# Patient Record
Sex: FEMALE | Race: BLACK OR AFRICAN AMERICAN | Employment: OTHER | URBAN - METROPOLITAN AREA
[De-identification: names, ages, dates, MRNs, and addresses within clinical notes are randomized per-mention and may not be internally consistent; named-entity substitution may affect disease eponyms.]

---

## 2018-01-01 ENCOUNTER — ANESTHESIA EVENT (OUTPATIENT)
Dept: ENDOSCOPY | Age: 78
End: 2018-01-01
Payer: MEDICARE

## 2018-01-01 ENCOUNTER — HOSPITAL ENCOUNTER (OUTPATIENT)
Dept: MRI IMAGING | Age: 78
Discharge: HOME | End: 2018-10-19
Attending: INTERNAL MEDICINE
Payer: MEDICARE

## 2018-01-01 ENCOUNTER — HOSPITAL ENCOUNTER (OUTPATIENT)
Dept: INFUSION THERAPY | Age: 78
Discharge: HOME | End: 2018-10-01
Payer: MEDICARE

## 2018-01-01 ENCOUNTER — HOSPITAL ENCOUNTER (OUTPATIENT)
Dept: CT IMAGING | Age: 78
Discharge: HOME | End: 2018-09-12
Attending: SURGERY
Payer: MEDICARE

## 2018-01-01 ENCOUNTER — HOSPITAL ENCOUNTER (OUTPATIENT)
Dept: INFUSION THERAPY | Age: 78
Discharge: HOME | End: 2018-09-24
Payer: MEDICARE

## 2018-01-01 ENCOUNTER — OFFICE VISIT (OUTPATIENT)
Dept: SURGERY | Age: 78
End: 2018-01-01

## 2018-01-01 ENCOUNTER — APPOINTMENT (OUTPATIENT)
Dept: GENERAL RADIOLOGY | Age: 78
End: 2018-01-01
Attending: INTERNAL MEDICINE
Payer: MEDICARE

## 2018-01-01 ENCOUNTER — HOSPITAL ENCOUNTER (OUTPATIENT)
Age: 78
Setting detail: OUTPATIENT SURGERY
Discharge: HOME | End: 2018-08-20
Attending: INTERNAL MEDICINE | Admitting: INTERNAL MEDICINE
Payer: MEDICARE

## 2018-01-01 ENCOUNTER — HOSPITAL ENCOUNTER (OUTPATIENT)
Dept: CT IMAGING | Age: 78
Discharge: HOME | End: 2018-08-13
Attending: INTERNAL MEDICINE
Payer: MEDICARE

## 2018-01-01 ENCOUNTER — OFFICE VISIT (OUTPATIENT)
Dept: ONCOLOGY | Age: 78
End: 2018-01-01

## 2018-01-01 ENCOUNTER — HOSPITAL ENCOUNTER (OUTPATIENT)
Dept: INFUSION THERAPY | Age: 78
Discharge: HOME | End: 2018-09-17
Payer: MEDICARE

## 2018-01-01 ENCOUNTER — HOSPITAL ENCOUNTER (OUTPATIENT)
Dept: INFUSION THERAPY | Age: 78
Discharge: HOME | End: 2018-09-05
Payer: MEDICARE

## 2018-01-01 ENCOUNTER — HOSPITAL ENCOUNTER (OUTPATIENT)
Age: 78
Setting detail: OUTPATIENT SURGERY
Discharge: HOME | End: 2018-10-17
Attending: INTERNAL MEDICINE | Admitting: INTERNAL MEDICINE
Payer: MEDICARE

## 2018-01-01 ENCOUNTER — TELEPHONE (OUTPATIENT)
Dept: SURGERY | Age: 78
End: 2018-01-01

## 2018-01-01 ENCOUNTER — HOSPITAL ENCOUNTER (OUTPATIENT)
Dept: INFUSION THERAPY | Age: 78
Discharge: HOME | End: 2018-09-07
Payer: MEDICARE

## 2018-01-01 ENCOUNTER — HOSPITAL ENCOUNTER (OUTPATIENT)
Dept: INFUSION THERAPY | Age: 78
Discharge: HOME | End: 2018-10-15
Payer: MEDICARE

## 2018-01-01 ENCOUNTER — HOSPITAL ENCOUNTER (OUTPATIENT)
Dept: INFUSION THERAPY | Age: 78
Discharge: HOME | End: 2018-10-16
Payer: MEDICARE

## 2018-01-01 ENCOUNTER — HOSPITAL ENCOUNTER (OUTPATIENT)
Dept: INFUSION THERAPY | Age: 78
End: 2018-01-01
Payer: MEDICARE

## 2018-01-01 ENCOUNTER — ANESTHESIA (OUTPATIENT)
Dept: ENDOSCOPY | Age: 78
End: 2018-01-01
Payer: MEDICARE

## 2018-01-01 ENCOUNTER — HOSPITAL ENCOUNTER (OUTPATIENT)
Dept: INTERVENTIONAL RADIOLOGY/VASCULAR | Age: 78
Discharge: HOME | End: 2018-09-12
Attending: INTERNAL MEDICINE
Payer: MEDICARE

## 2018-01-01 ENCOUNTER — HOSPITAL ENCOUNTER (OUTPATIENT)
Dept: LAB | Age: 78
Discharge: HOME | End: 2018-09-04
Payer: MEDICARE

## 2018-01-01 ENCOUNTER — HOSPITAL ENCOUNTER (OUTPATIENT)
Dept: INFUSION THERAPY | Age: 78
Discharge: HOME | End: 2018-09-25
Payer: MEDICARE

## 2018-01-01 ENCOUNTER — TELEPHONE (OUTPATIENT)
Dept: ONCOLOGY | Age: 78
End: 2018-01-01

## 2018-01-01 ENCOUNTER — APPOINTMENT (OUTPATIENT)
Dept: INFUSION THERAPY | Age: 78
End: 2018-01-01
Payer: MEDICARE

## 2018-01-01 VITALS
WEIGHT: 143 LBS | BODY MASS INDEX: 21.67 KG/M2 | HEART RATE: 88 BPM | OXYGEN SATURATION: 97 % | TEMPERATURE: 98.6 F | RESPIRATION RATE: 18 BRPM | HEIGHT: 68 IN | DIASTOLIC BLOOD PRESSURE: 70 MMHG | SYSTOLIC BLOOD PRESSURE: 132 MMHG

## 2018-01-01 VITALS
DIASTOLIC BLOOD PRESSURE: 75 MMHG | SYSTOLIC BLOOD PRESSURE: 142 MMHG | RESPIRATION RATE: 18 BRPM | OXYGEN SATURATION: 97 % | WEIGHT: 136 LBS | BODY MASS INDEX: 20.61 KG/M2 | HEART RATE: 80 BPM | TEMPERATURE: 98.9 F | HEIGHT: 68 IN

## 2018-01-01 VITALS
HEART RATE: 78 BPM | BODY MASS INDEX: 20.85 KG/M2 | WEIGHT: 137.56 LBS | SYSTOLIC BLOOD PRESSURE: 160 MMHG | RESPIRATION RATE: 16 BRPM | HEIGHT: 68 IN | TEMPERATURE: 99.2 F | OXYGEN SATURATION: 96 % | DIASTOLIC BLOOD PRESSURE: 73 MMHG

## 2018-01-01 VITALS
DIASTOLIC BLOOD PRESSURE: 74 MMHG | BODY MASS INDEX: 20.85 KG/M2 | RESPIRATION RATE: 16 BRPM | TEMPERATURE: 98.7 F | SYSTOLIC BLOOD PRESSURE: 147 MMHG | HEART RATE: 77 BPM | WEIGHT: 137.56 LBS | OXYGEN SATURATION: 98 % | HEIGHT: 68 IN

## 2018-01-01 VITALS
BODY MASS INDEX: 20.87 KG/M2 | TEMPERATURE: 98.3 F | SYSTOLIC BLOOD PRESSURE: 133 MMHG | OXYGEN SATURATION: 98 % | WEIGHT: 137.7 LBS | HEIGHT: 68 IN | HEART RATE: 82 BPM | RESPIRATION RATE: 18 BRPM | DIASTOLIC BLOOD PRESSURE: 71 MMHG

## 2018-01-01 VITALS
WEIGHT: 136 LBS | OXYGEN SATURATION: 97 % | RESPIRATION RATE: 18 BRPM | HEIGHT: 68 IN | DIASTOLIC BLOOD PRESSURE: 78 MMHG | SYSTOLIC BLOOD PRESSURE: 128 MMHG | HEART RATE: 79 BPM | TEMPERATURE: 98.7 F | BODY MASS INDEX: 20.61 KG/M2

## 2018-01-01 VITALS
BODY MASS INDEX: 20.88 KG/M2 | RESPIRATION RATE: 17 BRPM | HEART RATE: 113 BPM | SYSTOLIC BLOOD PRESSURE: 168 MMHG | OXYGEN SATURATION: 92 % | HEIGHT: 67 IN | WEIGHT: 133 LBS | DIASTOLIC BLOOD PRESSURE: 92 MMHG | TEMPERATURE: 97.6 F

## 2018-01-01 VITALS
RESPIRATION RATE: 18 BRPM | HEART RATE: 85 BPM | OXYGEN SATURATION: 100 % | DIASTOLIC BLOOD PRESSURE: 63 MMHG | TEMPERATURE: 98.1 F | SYSTOLIC BLOOD PRESSURE: 130 MMHG

## 2018-01-01 VITALS
BODY MASS INDEX: 20.16 KG/M2 | TEMPERATURE: 95.9 F | WEIGHT: 133 LBS | DIASTOLIC BLOOD PRESSURE: 74 MMHG | HEART RATE: 103 BPM | HEIGHT: 68 IN | OXYGEN SATURATION: 96 % | RESPIRATION RATE: 16 BRPM | SYSTOLIC BLOOD PRESSURE: 144 MMHG

## 2018-01-01 VITALS
DIASTOLIC BLOOD PRESSURE: 76 MMHG | HEIGHT: 68 IN | WEIGHT: 136.7 LBS | HEART RATE: 85 BPM | TEMPERATURE: 98.2 F | SYSTOLIC BLOOD PRESSURE: 125 MMHG | RESPIRATION RATE: 18 BRPM | BODY MASS INDEX: 20.72 KG/M2

## 2018-01-01 VITALS — WEIGHT: 133 LBS | BODY MASS INDEX: 20.83 KG/M2

## 2018-01-01 VITALS
SYSTOLIC BLOOD PRESSURE: 116 MMHG | DIASTOLIC BLOOD PRESSURE: 54 MMHG | BODY MASS INDEX: 21.29 KG/M2 | HEART RATE: 92 BPM | TEMPERATURE: 97.8 F | OXYGEN SATURATION: 100 % | WEIGHT: 140 LBS | RESPIRATION RATE: 16 BRPM

## 2018-01-01 VITALS
RESPIRATION RATE: 18 BRPM | DIASTOLIC BLOOD PRESSURE: 43 MMHG | TEMPERATURE: 97.7 F | HEART RATE: 81 BPM | OXYGEN SATURATION: 100 % | SYSTOLIC BLOOD PRESSURE: 120 MMHG

## 2018-01-01 VITALS
RESPIRATION RATE: 18 BRPM | OXYGEN SATURATION: 91 % | TEMPERATURE: 98 F | HEIGHT: 67 IN | HEART RATE: 123 BPM | WEIGHT: 133 LBS | SYSTOLIC BLOOD PRESSURE: 154 MMHG | BODY MASS INDEX: 20.88 KG/M2 | DIASTOLIC BLOOD PRESSURE: 86 MMHG

## 2018-01-01 VITALS
DIASTOLIC BLOOD PRESSURE: 76 MMHG | TEMPERATURE: 98.6 F | SYSTOLIC BLOOD PRESSURE: 143 MMHG | HEART RATE: 68 BPM | HEIGHT: 67 IN | BODY MASS INDEX: 21.19 KG/M2 | RESPIRATION RATE: 18 BRPM | WEIGHT: 135 LBS

## 2018-01-01 VITALS
HEIGHT: 67 IN | BODY MASS INDEX: 21.03 KG/M2 | DIASTOLIC BLOOD PRESSURE: 59 MMHG | TEMPERATURE: 98.7 F | SYSTOLIC BLOOD PRESSURE: 151 MMHG | WEIGHT: 134 LBS | OXYGEN SATURATION: 100 % | RESPIRATION RATE: 18 BRPM | HEART RATE: 92 BPM

## 2018-01-01 VITALS
DIASTOLIC BLOOD PRESSURE: 78 MMHG | RESPIRATION RATE: 16 BRPM | HEART RATE: 99 BPM | TEMPERATURE: 97.9 F | SYSTOLIC BLOOD PRESSURE: 146 MMHG | OXYGEN SATURATION: 98 %

## 2018-01-01 VITALS
TEMPERATURE: 98.6 F | OXYGEN SATURATION: 96 % | SYSTOLIC BLOOD PRESSURE: 154 MMHG | DIASTOLIC BLOOD PRESSURE: 82 MMHG | RESPIRATION RATE: 18 BRPM | HEART RATE: 70 BPM

## 2018-01-01 DIAGNOSIS — T45.1X5A ANEMIA DUE TO CHEMOTHERAPY: ICD-10-CM

## 2018-01-01 DIAGNOSIS — C23 GALLBLADDER CANCER (HCC): ICD-10-CM

## 2018-01-01 DIAGNOSIS — D64.81 ANEMIA DUE TO CHEMOTHERAPY: ICD-10-CM

## 2018-01-01 DIAGNOSIS — R17 JAUNDICE: ICD-10-CM

## 2018-01-01 DIAGNOSIS — E87.6 HYPOKALEMIA: ICD-10-CM

## 2018-01-01 DIAGNOSIS — K82.8 GALLBLADDER MASS: ICD-10-CM

## 2018-01-01 DIAGNOSIS — R63.0 LOSS OF APPETITE: ICD-10-CM

## 2018-01-01 DIAGNOSIS — L29.9 PRURITUS OF SKIN: ICD-10-CM

## 2018-01-01 DIAGNOSIS — D64.81 ANEMIA ASSOCIATED WITH CHEMOTHERAPY: ICD-10-CM

## 2018-01-01 DIAGNOSIS — E44.0 MODERATE PROTEIN-CALORIE MALNUTRITION (HCC): ICD-10-CM

## 2018-01-01 DIAGNOSIS — R16.0 LIVER MASS: Primary | ICD-10-CM

## 2018-01-01 DIAGNOSIS — T45.1X5A ANEMIA ASSOCIATED WITH CHEMOTHERAPY: ICD-10-CM

## 2018-01-01 DIAGNOSIS — R16.0 LIVER MASS: ICD-10-CM

## 2018-01-01 DIAGNOSIS — C23 PRIMARY CANCER OF GALLBLADDER WITH METASTASIS TO OTHER SITE (HCC): Primary | ICD-10-CM

## 2018-01-01 DIAGNOSIS — D64.9 ANEMIA, UNSPECIFIED TYPE: ICD-10-CM

## 2018-01-01 DIAGNOSIS — E80.6 HYPERBILIRUBINEMIA: ICD-10-CM

## 2018-01-01 DIAGNOSIS — C22.1 CHOLANGIOCARCINOMA (HCC): ICD-10-CM

## 2018-01-01 DIAGNOSIS — C23 GALLBLADDER CANCER (HCC): Primary | ICD-10-CM

## 2018-01-01 LAB
ABO + RH BLD: NORMAL
ALBUMIN SERPL-MCNC: 1.3 G/DL (ref 3.5–5)
ALBUMIN SERPL-MCNC: 1.6 G/DL (ref 3.5–5)
ALBUMIN SERPL-MCNC: 1.6 G/DL (ref 3.5–5)
ALBUMIN SERPL-MCNC: 1.8 G/DL (ref 3.5–5)
ALBUMIN SERPL-MCNC: 2.7 G/DL (ref 3.5–4.8)
ALBUMIN SERPL-MCNC: 3.5 G/DL (ref 3.5–4.8)
ALBUMIN/GLOB SERPL: 0.3 {RATIO} (ref 1.1–2.2)
ALBUMIN/GLOB SERPL: 0.3 {RATIO} (ref 1.1–2.2)
ALBUMIN/GLOB SERPL: 0.4 {RATIO} (ref 1.1–2.2)
ALBUMIN/GLOB SERPL: 0.4 {RATIO} (ref 1.1–2.2)
ALBUMIN/GLOB SERPL: 0.9 {RATIO} (ref 1.2–2.2)
ALBUMIN/GLOB SERPL: 1.1 {RATIO} (ref 1.2–2.2)
ALP SERPL-CCNC: 520 U/L (ref 45–117)
ALP SERPL-CCNC: 658 IU/L (ref 39–117)
ALP SERPL-CCNC: 682 U/L (ref 45–117)
ALP SERPL-CCNC: 692 U/L (ref 45–117)
ALP SERPL-CCNC: 706 U/L (ref 45–117)
ALP SERPL-CCNC: 812 IU/L (ref 39–117)
ALT SERPL-CCNC: 268 IU/L (ref 0–32)
ALT SERPL-CCNC: 42 U/L (ref 12–78)
ALT SERPL-CCNC: 43 U/L (ref 12–78)
ALT SERPL-CCNC: 62 U/L (ref 12–78)
ALT SERPL-CCNC: 67 U/L (ref 12–78)
ALT SERPL-CCNC: 87 IU/L (ref 0–32)
ANION GAP SERPL CALC-SCNC: 6 MMOL/L (ref 5–15)
ANION GAP SERPL CALC-SCNC: 8 MMOL/L (ref 5–15)
ANION GAP SERPL CALC-SCNC: 9 MMOL/L (ref 5–15)
ANION GAP SERPL CALC-SCNC: 9 MMOL/L (ref 5–15)
AST SERPL-CCNC: 120 IU/L (ref 0–40)
AST SERPL-CCNC: 226 IU/L (ref 0–40)
AST SERPL-CCNC: 54 U/L (ref 15–37)
AST SERPL-CCNC: 65 U/L (ref 15–37)
AST SERPL-CCNC: 69 U/L (ref 15–37)
AST SERPL-CCNC: 94 U/L (ref 15–37)
BASOPHILS # BLD AUTO: 0 X10E3/UL (ref 0–0.2)
BASOPHILS # BLD: 0 K/UL (ref 0–0.1)
BASOPHILS NFR BLD AUTO: 0 %
BASOPHILS NFR BLD: 0 % (ref 0–1)
BILIRUB SERPL-MCNC: 14.1 MG/DL (ref 0–1.2)
BILIRUB SERPL-MCNC: 4 MG/DL (ref 0.2–1)
BILIRUB SERPL-MCNC: 4.2 MG/DL (ref 0.2–1)
BILIRUB SERPL-MCNC: 4.4 MG/DL (ref 0.2–1)
BILIRUB SERPL-MCNC: 6.1 MG/DL (ref 0–1.2)
BILIRUB SERPL-MCNC: 7.7 MG/DL (ref 0.2–1)
BLD PROD TYP BPU: NORMAL
BLOOD GROUP ANTIBODIES SERPL: NORMAL
BPU ID: NORMAL
BUN SERPL-MCNC: 11 MG/DL (ref 6–20)
BUN SERPL-MCNC: 13 MG/DL (ref 6–20)
BUN SERPL-MCNC: 15 MG/DL (ref 8–27)
BUN SERPL-MCNC: 16 MG/DL (ref 6–20)
BUN SERPL-MCNC: 23 MG/DL (ref 8–27)
BUN SERPL-MCNC: 33 MG/DL (ref 6–20)
BUN/CREAT SERPL: 14 (ref 12–20)
BUN/CREAT SERPL: 14 (ref 12–20)
BUN/CREAT SERPL: 19 (ref 12–20)
BUN/CREAT SERPL: 20 (ref 12–28)
BUN/CREAT SERPL: 21 (ref 12–28)
BUN/CREAT SERPL: 30 (ref 12–20)
CALCIUM SERPL-MCNC: 7.1 MG/DL (ref 8.5–10.1)
CALCIUM SERPL-MCNC: 7.8 MG/DL (ref 8.5–10.1)
CALCIUM SERPL-MCNC: 7.9 MG/DL (ref 8.5–10.1)
CALCIUM SERPL-MCNC: 8.1 MG/DL (ref 8.5–10.1)
CALCIUM SERPL-MCNC: 8.3 MG/DL (ref 8.7–10.3)
CALCIUM SERPL-MCNC: 9.1 MG/DL (ref 8.7–10.3)
CANCER AG19-9 SERPL-ACNC: ABNORMAL U/ML (ref 0–35)
CHLORIDE SERPL-SCNC: 101 MMOL/L (ref 96–106)
CHLORIDE SERPL-SCNC: 101 MMOL/L (ref 96–106)
CHLORIDE SERPL-SCNC: 103 MMOL/L (ref 97–108)
CHLORIDE SERPL-SCNC: 96 MMOL/L (ref 97–108)
CHLORIDE SERPL-SCNC: 96 MMOL/L (ref 97–108)
CHLORIDE SERPL-SCNC: 97 MMOL/L (ref 97–108)
CO2 SERPL-SCNC: 20 MMOL/L (ref 20–29)
CO2 SERPL-SCNC: 20 MMOL/L (ref 20–29)
CO2 SERPL-SCNC: 25 MMOL/L (ref 21–32)
CO2 SERPL-SCNC: 27 MMOL/L (ref 21–32)
CO2 SERPL-SCNC: 28 MMOL/L (ref 21–32)
CO2 SERPL-SCNC: 29 MMOL/L (ref 21–32)
CREAT BLD-MCNC: 1.1 MG/DL (ref 0.6–1.3)
CREAT SERPL-MCNC: 0.74 MG/DL (ref 0.57–1)
CREAT SERPL-MCNC: 0.79 MG/DL (ref 0.55–1.02)
CREAT SERPL-MCNC: 0.84 MG/DL (ref 0.55–1.02)
CREAT SERPL-MCNC: 0.9 MG/DL (ref 0.55–1.02)
CREAT SERPL-MCNC: 1.1 MG/DL (ref 0.55–1.02)
CREAT SERPL-MCNC: 1.12 MG/DL (ref 0.57–1)
CROSSMATCH RESULT,%XM: NORMAL
DIFFERENTIAL METHOD BLD: ABNORMAL
EOSINOPHIL # BLD AUTO: 0.1 X10E3/UL (ref 0–0.4)
EOSINOPHIL # BLD: 0 K/UL (ref 0–0.4)
EOSINOPHIL # BLD: 0.1 K/UL (ref 0–0.4)
EOSINOPHIL NFR BLD AUTO: 1 %
EOSINOPHIL NFR BLD: 0 % (ref 0–7)
EOSINOPHIL NFR BLD: 1 % (ref 0–7)
ERYTHROCYTE [DISTWIDTH] IN BLOOD BY AUTOMATED COUNT: 14.5 % (ref 11.5–14.5)
ERYTHROCYTE [DISTWIDTH] IN BLOOD BY AUTOMATED COUNT: 15 % (ref 12.3–15.4)
ERYTHROCYTE [DISTWIDTH] IN BLOOD BY AUTOMATED COUNT: 15.2 % (ref 11.5–14.5)
ERYTHROCYTE [DISTWIDTH] IN BLOOD BY AUTOMATED COUNT: 17 % (ref 12.3–15.4)
ERYTHROCYTE [DISTWIDTH] IN BLOOD BY AUTOMATED COUNT: 18.3 % (ref 11.5–14.5)
ERYTHROCYTE [DISTWIDTH] IN BLOOD BY AUTOMATED COUNT: 19 % (ref 11.5–14.5)
FERRITIN SERPL-MCNC: 827 NG/ML (ref 8–252)
GLOBULIN SER CALC-MCNC: 3.1 G/DL (ref 1.5–4.5)
GLOBULIN SER CALC-MCNC: 3.2 G/DL (ref 1.5–4.5)
GLOBULIN SER CALC-MCNC: 4 G/DL (ref 2–4)
GLOBULIN SER CALC-MCNC: 4.2 G/DL (ref 2–4)
GLOBULIN SER CALC-MCNC: 4.3 G/DL (ref 2–4)
GLOBULIN SER CALC-MCNC: 4.7 G/DL (ref 2–4)
GLUCOSE SERPL-MCNC: 123 MG/DL (ref 65–100)
GLUCOSE SERPL-MCNC: 174 MG/DL (ref 65–100)
GLUCOSE SERPL-MCNC: 180 MG/DL (ref 65–100)
GLUCOSE SERPL-MCNC: 181 MG/DL (ref 65–99)
GLUCOSE SERPL-MCNC: 320 MG/DL (ref 65–100)
GLUCOSE SERPL-MCNC: 72 MG/DL (ref 65–99)
HCT VFR BLD AUTO: 19.7 % (ref 35–47)
HCT VFR BLD AUTO: 20.6 % (ref 34–46.6)
HCT VFR BLD AUTO: 21.5 % (ref 35–47)
HCT VFR BLD AUTO: 21.7 % (ref 35–47)
HCT VFR BLD AUTO: 24.6 % (ref 35–47)
HCT VFR BLD AUTO: 27.9 % (ref 34–46.6)
HGB BLD-MCNC: 6.5 G/DL (ref 11.1–15.9)
HGB BLD-MCNC: 6.6 G/DL (ref 11.5–16)
HGB BLD-MCNC: 7 G/DL (ref 11.5–16)
HGB BLD-MCNC: 7.1 G/DL (ref 11.5–16)
HGB BLD-MCNC: 8.1 G/DL (ref 11.5–16)
HGB BLD-MCNC: 9.1 G/DL (ref 11.1–15.9)
IMM GRANULOCYTES # BLD: 0 K/UL (ref 0–0.04)
IMM GRANULOCYTES # BLD: 0.1 K/UL (ref 0–0.04)
IMM GRANULOCYTES # BLD: 0.1 K/UL (ref 0–0.04)
IMM GRANULOCYTES # BLD: 0.1 X10E3/UL (ref 0–0.1)
IMM GRANULOCYTES # BLD: 0.4 K/UL (ref 0–0.04)
IMM GRANULOCYTES NFR BLD AUTO: 0 % (ref 0–0.5)
IMM GRANULOCYTES NFR BLD AUTO: 1 % (ref 0–0.5)
IMM GRANULOCYTES NFR BLD AUTO: 1 % (ref 0–0.5)
IMM GRANULOCYTES NFR BLD AUTO: 4 % (ref 0–0.5)
IMM GRANULOCYTES NFR BLD: 1 %
IRON SATN MFR SERPL: 13 % (ref 20–50)
IRON SERPL-MCNC: 18 UG/DL (ref 35–150)
LYMPHOCYTES # BLD AUTO: 1.4 X10E3/UL (ref 0.7–3.1)
LYMPHOCYTES # BLD: 1 K/UL (ref 0.8–3.5)
LYMPHOCYTES # BLD: 1.1 K/UL (ref 0.8–3.5)
LYMPHOCYTES NFR BLD AUTO: 14 %
LYMPHOCYTES NFR BLD: 10 % (ref 12–49)
LYMPHOCYTES NFR BLD: 12 % (ref 12–49)
LYMPHOCYTES NFR BLD: 8 % (ref 12–49)
LYMPHOCYTES NFR BLD: 9 % (ref 12–49)
MAGNESIUM SERPL-MCNC: 1.3 MG/DL (ref 1.6–2.4)
MAGNESIUM SERPL-MCNC: 1.4 MG/DL (ref 1.6–2.4)
MAGNESIUM SERPL-MCNC: 1.5 MG/DL (ref 1.6–2.4)
MAGNESIUM SERPL-MCNC: 1.5 MG/DL (ref 1.6–2.4)
MCH RBC QN AUTO: 28.2 PG (ref 26.6–33)
MCH RBC QN AUTO: 29 PG (ref 26.6–33)
MCH RBC QN AUTO: 29.5 PG (ref 26–34)
MCH RBC QN AUTO: 29.5 PG (ref 26–34)
MCH RBC QN AUTO: 30 PG (ref 26–34)
MCH RBC QN AUTO: 30.9 PG (ref 26–34)
MCHC RBC AUTO-ENTMCNC: 31.6 G/DL (ref 31.5–35.7)
MCHC RBC AUTO-ENTMCNC: 32.6 G/DL (ref 30–36.5)
MCHC RBC AUTO-ENTMCNC: 32.6 G/DL (ref 31.5–35.7)
MCHC RBC AUTO-ENTMCNC: 32.7 G/DL (ref 30–36.5)
MCHC RBC AUTO-ENTMCNC: 32.9 G/DL (ref 30–36.5)
MCHC RBC AUTO-ENTMCNC: 33.5 G/DL (ref 30–36.5)
MCV RBC AUTO: 86 FL (ref 79–97)
MCV RBC AUTO: 87.9 FL (ref 80–99)
MCV RBC AUTO: 90.7 FL (ref 80–99)
MCV RBC AUTO: 91.6 FL (ref 80–99)
MCV RBC AUTO: 92 FL (ref 79–97)
MCV RBC AUTO: 93.9 FL (ref 80–99)
MONOCYTES # BLD AUTO: 0.8 X10E3/UL (ref 0.1–0.9)
MONOCYTES # BLD: 0.1 K/UL (ref 0–1)
MONOCYTES # BLD: 0.7 K/UL (ref 0–1)
MONOCYTES # BLD: 1 K/UL (ref 0–1)
MONOCYTES # BLD: 1.2 K/UL (ref 0–1)
MONOCYTES NFR BLD AUTO: 8 %
MONOCYTES NFR BLD: 1 % (ref 5–13)
MONOCYTES NFR BLD: 13 % (ref 5–13)
MONOCYTES NFR BLD: 7 % (ref 5–13)
MONOCYTES NFR BLD: 9 % (ref 5–13)
NEUTROPHILS # BLD AUTO: 7.6 X10E3/UL (ref 1.4–7)
NEUTROPHILS NFR BLD AUTO: 76 %
NEUTS BAND NFR BLD MANUAL: 1 %
NEUTS SEG # BLD: 11.3 K/UL (ref 1.8–8)
NEUTS SEG # BLD: 6.8 K/UL (ref 1.8–8)
NEUTS SEG # BLD: 7.1 K/UL (ref 1.8–8)
NEUTS SEG # BLD: 8.9 K/UL (ref 1.8–8)
NEUTS SEG NFR BLD: 72 % (ref 32–75)
NEUTS SEG NFR BLD: 78 % (ref 32–75)
NEUTS SEG NFR BLD: 80 % (ref 32–75)
NEUTS SEG NFR BLD: 90 % (ref 32–75)
NRBC # BLD: 0 K/UL (ref 0–0.01)
NRBC # BLD: 0 K/UL (ref 0–0.01)
NRBC # BLD: 0.02 K/UL (ref 0–0.01)
NRBC # BLD: 0.02 K/UL (ref 0–0.01)
NRBC BLD-RTO: 0 PER 100 WBC
NRBC BLD-RTO: 0 PER 100 WBC
NRBC BLD-RTO: 0.2 PER 100 WBC
NRBC BLD-RTO: 0.2 PER 100 WBC
PLATELET # BLD AUTO: 121 K/UL (ref 150–400)
PLATELET # BLD AUTO: 208 K/UL (ref 150–400)
PLATELET # BLD AUTO: 246 K/UL (ref 150–400)
PLATELET # BLD AUTO: 337 X10E3/UL (ref 150–379)
PLATELET # BLD AUTO: 410 X10E3/UL (ref 150–379)
PLATELET # BLD AUTO: 69 K/UL (ref 150–400)
PMV BLD AUTO: 10.2 FL (ref 8.9–12.9)
PMV BLD AUTO: 11 FL (ref 8.9–12.9)
PMV BLD AUTO: 9.9 FL (ref 8.9–12.9)
POTASSIUM SERPL-SCNC: 3.1 MMOL/L (ref 3.5–5.1)
POTASSIUM SERPL-SCNC: 3.1 MMOL/L (ref 3.5–5.1)
POTASSIUM SERPL-SCNC: 3.6 MMOL/L (ref 3.5–5.1)
POTASSIUM SERPL-SCNC: 3.8 MMOL/L (ref 3.5–5.1)
POTASSIUM SERPL-SCNC: 4.1 MMOL/L (ref 3.5–5.2)
POTASSIUM SERPL-SCNC: 4.2 MMOL/L (ref 3.5–5.2)
PROT SERPL-MCNC: 5.6 G/DL (ref 6.4–8.2)
PROT SERPL-MCNC: 5.6 G/DL (ref 6.4–8.2)
PROT SERPL-MCNC: 5.8 G/DL (ref 6–8.5)
PROT SERPL-MCNC: 6 G/DL (ref 6.4–8.2)
PROT SERPL-MCNC: 6.3 G/DL (ref 6.4–8.2)
PROT SERPL-MCNC: 6.7 G/DL (ref 6–8.5)
RBC # BLD AUTO: 2.24 M/UL (ref 3.8–5.2)
RBC # BLD AUTO: 2.24 X10E6/UL (ref 3.77–5.28)
RBC # BLD AUTO: 2.37 M/UL (ref 3.8–5.2)
RBC # BLD AUTO: 2.37 M/UL (ref 3.8–5.2)
RBC # BLD AUTO: 2.62 M/UL (ref 3.8–5.2)
RBC # BLD AUTO: 3.23 X10E6/UL (ref 3.77–5.28)
RBC MORPH BLD: ABNORMAL
SODIUM SERPL-SCNC: 131 MMOL/L (ref 136–145)
SODIUM SERPL-SCNC: 132 MMOL/L (ref 136–145)
SODIUM SERPL-SCNC: 133 MMOL/L (ref 136–145)
SODIUM SERPL-SCNC: 137 MMOL/L (ref 134–144)
SODIUM SERPL-SCNC: 137 MMOL/L (ref 134–144)
SODIUM SERPL-SCNC: 137 MMOL/L (ref 136–145)
SPECIMEN EXP DATE BLD: NORMAL
STATUS OF UNIT,%ST: NORMAL
TIBC SERPL-MCNC: 137 UG/DL (ref 250–450)
UNIT DIVISION, %UDIV: 0
WBC # BLD AUTO: 10 X10E3/UL (ref 3.4–10.8)
WBC # BLD AUTO: 11.1 K/UL (ref 3.6–11)
WBC # BLD AUTO: 12.4 K/UL (ref 3.6–11)
WBC # BLD AUTO: 9 K/UL (ref 3.6–11)
WBC # BLD AUTO: 9 X10E3/UL (ref 3.4–10.8)
WBC # BLD AUTO: 9.5 K/UL (ref 3.6–11)
WBC MORPH BLD: ABNORMAL

## 2018-01-01 PROCEDURE — 82728 ASSAY OF FERRITIN: CPT | Performed by: INTERNAL MEDICINE

## 2018-01-01 PROCEDURE — 74011250636 HC RX REV CODE- 250/636: Performed by: INTERNAL MEDICINE

## 2018-01-01 PROCEDURE — 96375 TX/PRO/DX INJ NEW DRUG ADDON: CPT

## 2018-01-01 PROCEDURE — 83735 ASSAY OF MAGNESIUM: CPT | Performed by: NURSE PRACTITIONER

## 2018-01-01 PROCEDURE — 36415 COLL VENOUS BLD VENIPUNCTURE: CPT | Performed by: INTERNAL MEDICINE

## 2018-01-01 PROCEDURE — 96413 CHEMO IV INFUSION 1 HR: CPT

## 2018-01-01 PROCEDURE — 77030013169 SET IV BLD ICUM -A

## 2018-01-01 PROCEDURE — 77030012596 HC SPHNTOM BILI BSC -E: Performed by: INTERNAL MEDICINE

## 2018-01-01 PROCEDURE — 85025 COMPLETE CBC W/AUTO DIFF WBC: CPT | Performed by: NURSE PRACTITIONER

## 2018-01-01 PROCEDURE — 86900 BLOOD TYPING SEROLOGIC ABO: CPT | Performed by: INTERNAL MEDICINE

## 2018-01-01 PROCEDURE — 74011000258 HC RX REV CODE- 258: Performed by: INTERNAL MEDICINE

## 2018-01-01 PROCEDURE — 74011000250 HC RX REV CODE- 250

## 2018-01-01 PROCEDURE — 36415 COLL VENOUS BLD VENIPUNCTURE: CPT | Performed by: NURSE PRACTITIONER

## 2018-01-01 PROCEDURE — 77030003666 HC NDL SPINAL BD -A

## 2018-01-01 PROCEDURE — 96366 THER/PROPH/DIAG IV INF ADDON: CPT

## 2018-01-01 PROCEDURE — 83735 ASSAY OF MAGNESIUM: CPT | Performed by: INTERNAL MEDICINE

## 2018-01-01 PROCEDURE — 96372 THER/PROPH/DIAG INJ SC/IM: CPT

## 2018-01-01 PROCEDURE — P9016 RBC LEUKOCYTES REDUCED: HCPCS | Performed by: INTERNAL MEDICINE

## 2018-01-01 PROCEDURE — 74011250636 HC RX REV CODE- 250/636

## 2018-01-01 PROCEDURE — 74183 MRI ABD W/O CNTR FLWD CNTR: CPT

## 2018-01-01 PROCEDURE — 77030008684 HC TU ET CUF COVD -B: Performed by: ANESTHESIOLOGY

## 2018-01-01 PROCEDURE — 77030012965 HC NDL HUBR BBMI -A

## 2018-01-01 PROCEDURE — C1874 STENT, COATED/COV W/DEL SYS: HCPCS | Performed by: INTERNAL MEDICINE

## 2018-01-01 PROCEDURE — 77030007288 HC DEV LOK BILI BSC -A: Performed by: INTERNAL MEDICINE

## 2018-01-01 PROCEDURE — 77030031139 HC SUT VCRL2 J&J -A

## 2018-01-01 PROCEDURE — 96417 CHEMO IV INFUS EACH ADDL SEQ: CPT

## 2018-01-01 PROCEDURE — 74177 CT ABD & PELVIS W/CONTRAST: CPT

## 2018-01-01 PROCEDURE — 74011250636 HC RX REV CODE- 250/636: Performed by: RADIOLOGY

## 2018-01-01 PROCEDURE — 77030013992 HC SNR POLYP ENDOSC BSC -B: Performed by: INTERNAL MEDICINE

## 2018-01-01 PROCEDURE — 96367 TX/PROPH/DG ADDL SEQ IV INF: CPT

## 2018-01-01 PROCEDURE — 74011636320 HC RX REV CODE- 636/320: Performed by: RADIOLOGY

## 2018-01-01 PROCEDURE — 74011000250 HC RX REV CODE- 250: Performed by: INTERNAL MEDICINE

## 2018-01-01 PROCEDURE — 74011636320 HC RX REV CODE- 636/320: Performed by: INTERNAL MEDICINE

## 2018-01-01 PROCEDURE — 80053 COMPREHEN METABOLIC PANEL: CPT | Performed by: NURSE PRACTITIONER

## 2018-01-01 PROCEDURE — 96368 THER/DIAG CONCURRENT INF: CPT

## 2018-01-01 PROCEDURE — 36415 COLL VENOUS BLD VENIPUNCTURE: CPT

## 2018-01-01 PROCEDURE — 80053 COMPREHEN METABOLIC PANEL: CPT | Performed by: INTERNAL MEDICINE

## 2018-01-01 PROCEDURE — 36430 TRANSFUSION BLD/BLD COMPNT: CPT

## 2018-01-01 PROCEDURE — 80053 COMPREHEN METABOLIC PANEL: CPT

## 2018-01-01 PROCEDURE — 77030026438 HC STYL ET INTUB CARD -A: Performed by: ANESTHESIOLOGY

## 2018-01-01 PROCEDURE — 76040000008: Performed by: INTERNAL MEDICINE

## 2018-01-01 PROCEDURE — 96365 THER/PROPH/DIAG IV INF INIT: CPT

## 2018-01-01 PROCEDURE — 76060000033 HC ANESTHESIA 1 TO 1.5 HR: Performed by: INTERNAL MEDICINE

## 2018-01-01 PROCEDURE — 74011250637 HC RX REV CODE- 250/637: Performed by: NURSE PRACTITIONER

## 2018-01-01 PROCEDURE — 85025 COMPLETE CBC W/AUTO DIFF WBC: CPT | Performed by: INTERNAL MEDICINE

## 2018-01-01 PROCEDURE — 96361 HYDRATE IV INFUSION ADD-ON: CPT

## 2018-01-01 PROCEDURE — C1726 CATH, BAL DIL, NON-VASCULAR: HCPCS | Performed by: INTERNAL MEDICINE

## 2018-01-01 PROCEDURE — 88112 CYTOPATH CELL ENHANCE TECH: CPT | Performed by: INTERNAL MEDICINE

## 2018-01-01 PROCEDURE — 86920 COMPATIBILITY TEST SPIN: CPT | Performed by: INTERNAL MEDICINE

## 2018-01-01 PROCEDURE — 77030039266 HC ADH SKN EXOFIN S2SG -A

## 2018-01-01 PROCEDURE — A9585 GADOBUTROL INJECTION: HCPCS | Performed by: INTERNAL MEDICINE

## 2018-01-01 PROCEDURE — 74011636320 HC RX REV CODE- 636/320

## 2018-01-01 PROCEDURE — 88173 CYTOPATH EVAL FNA REPORT: CPT | Performed by: SURGERY

## 2018-01-01 PROCEDURE — 99152 MOD SED SAME PHYS/QHP 5/>YRS: CPT

## 2018-01-01 PROCEDURE — 83550 IRON BINDING TEST: CPT | Performed by: INTERNAL MEDICINE

## 2018-01-01 PROCEDURE — 74011000250 HC RX REV CODE- 250: Performed by: RADIOLOGY

## 2018-01-01 PROCEDURE — 86923 COMPATIBILITY TEST ELECTRIC: CPT | Performed by: INTERNAL MEDICINE

## 2018-01-01 PROCEDURE — C1892 INTRO/SHEATH,FIXED,PEEL-AWAY: HCPCS

## 2018-01-01 PROCEDURE — 74011250637 HC RX REV CODE- 250/637: Performed by: INTERNAL MEDICINE

## 2018-01-01 PROCEDURE — 74011250636 HC RX REV CODE- 250/636: Performed by: NURSE PRACTITIONER

## 2018-01-01 PROCEDURE — 76040000007: Performed by: INTERNAL MEDICINE

## 2018-01-01 PROCEDURE — 88305 TISSUE EXAM BY PATHOLOGIST: CPT | Performed by: SURGERY

## 2018-01-01 PROCEDURE — 85025 COMPLETE CBC W/AUTO DIFF WBC: CPT

## 2018-01-01 PROCEDURE — C2625 STENT, NON-COR, TEM W/DEL SY: HCPCS | Performed by: INTERNAL MEDICINE

## 2018-01-01 PROCEDURE — 77030014115

## 2018-01-01 PROCEDURE — 96415 CHEMO IV INFUSION ADDL HR: CPT

## 2018-01-01 PROCEDURE — 77001 FLUOROGUIDE FOR VEIN DEVICE: CPT

## 2018-01-01 PROCEDURE — 82565 ASSAY OF CREATININE: CPT

## 2018-01-01 PROCEDURE — 76060000032 HC ANESTHESIA 0.5 TO 1 HR: Performed by: INTERNAL MEDICINE

## 2018-01-01 PROCEDURE — C1788 PORT, INDWELLING, IMP: HCPCS

## 2018-01-01 PROCEDURE — 77030003503 HC NDL BIOP TISS BD -B

## 2018-01-01 DEVICE — BILIARY STENT WITH NAVIFLEXTM RX DELIVERY SYSTEM
Type: IMPLANTABLE DEVICE | Site: BILE DUCT | Status: FUNCTIONAL
Brand: ADVANIX™ BILIARY

## 2018-01-01 DEVICE — STENT SYSTEM RMV
Type: IMPLANTABLE DEVICE | Status: FUNCTIONAL
Brand: WALLFLEX BILIARY

## 2018-01-01 RX ORDER — SODIUM CHLORIDE 0.9 % (FLUSH) 0.9 %
10 SYRINGE (ML) INJECTION AS NEEDED
Status: DISCONTINUED | OUTPATIENT
Start: 2018-01-01 | End: 2018-01-01

## 2018-01-01 RX ORDER — SODIUM CHLORIDE 9 MG/ML
10 INJECTION INTRAMUSCULAR; INTRAVENOUS; SUBCUTANEOUS AS NEEDED
Status: CANCELLED | OUTPATIENT
Start: 2018-01-01

## 2018-01-01 RX ORDER — DIPHENHYDRAMINE HYDROCHLORIDE 50 MG/ML
50 INJECTION, SOLUTION INTRAMUSCULAR; INTRAVENOUS AS NEEDED
Status: CANCELLED
Start: 2018-01-01

## 2018-01-01 RX ORDER — SODIUM CHLORIDE 0.9 % (FLUSH) 0.9 %
5-10 SYRINGE (ML) INJECTION AS NEEDED
Status: ACTIVE | OUTPATIENT
Start: 2018-01-01 | End: 2018-01-01

## 2018-01-01 RX ORDER — SODIUM CHLORIDE 0.9 % (FLUSH) 0.9 %
5-10 SYRINGE (ML) INJECTION AS NEEDED
Status: DISPENSED | OUTPATIENT
Start: 2018-01-01 | End: 2018-01-01

## 2018-01-01 RX ORDER — ATROPINE SULFATE 0.1 MG/ML
0.5 INJECTION INTRAVENOUS
Status: DISCONTINUED | OUTPATIENT
Start: 2018-01-01 | End: 2018-01-01 | Stop reason: HOSPADM

## 2018-01-01 RX ORDER — FLUMAZENIL 0.1 MG/ML
0.2 INJECTION INTRAVENOUS
Status: DISCONTINUED | OUTPATIENT
Start: 2018-01-01 | End: 2018-01-01 | Stop reason: HOSPADM

## 2018-01-01 RX ORDER — ALBUTEROL SULFATE 0.83 MG/ML
2.5 SOLUTION RESPIRATORY (INHALATION) AS NEEDED
Status: CANCELLED
Start: 2018-01-01

## 2018-01-01 RX ORDER — HYDROCORTISONE SODIUM SUCCINATE 100 MG/2ML
100 INJECTION, POWDER, FOR SOLUTION INTRAMUSCULAR; INTRAVENOUS AS NEEDED
Status: CANCELLED | OUTPATIENT
Start: 2018-01-01

## 2018-01-01 RX ORDER — DIPHENHYDRAMINE HCL 25 MG
25 CAPSULE ORAL ONCE
Status: COMPLETED | OUTPATIENT
Start: 2018-01-01 | End: 2018-01-01

## 2018-01-01 RX ORDER — EPINEPHRINE 0.1 MG/ML
1 INJECTION INTRACARDIAC; INTRAVENOUS
Status: DISCONTINUED | OUTPATIENT
Start: 2018-01-01 | End: 2018-01-01 | Stop reason: HOSPADM

## 2018-01-01 RX ORDER — EPINEPHRINE 1 MG/ML
0.3 INJECTION, SOLUTION, CONCENTRATE INTRAVENOUS AS NEEDED
Status: CANCELLED | OUTPATIENT
Start: 2018-01-01

## 2018-01-01 RX ORDER — ONDANSETRON 2 MG/ML
8 INJECTION INTRAMUSCULAR; INTRAVENOUS AS NEEDED
Status: CANCELLED | OUTPATIENT
Start: 2018-01-01

## 2018-01-01 RX ORDER — ACETAMINOPHEN 325 MG/1
650 TABLET ORAL AS NEEDED
Status: CANCELLED
Start: 2018-01-01

## 2018-01-01 RX ORDER — FENTANYL CITRATE 50 UG/ML
100 INJECTION, SOLUTION INTRAMUSCULAR; INTRAVENOUS
Status: DISCONTINUED | OUTPATIENT
Start: 2018-01-01 | End: 2018-01-01 | Stop reason: HOSPADM

## 2018-01-01 RX ORDER — HEPARIN 100 UNIT/ML
300-500 SYRINGE INTRAVENOUS AS NEEDED
Status: CANCELLED
Start: 2018-12-17

## 2018-01-01 RX ORDER — ACETAMINOPHEN 325 MG/1
650 TABLET ORAL ONCE
Status: COMPLETED | OUTPATIENT
Start: 2018-01-01 | End: 2018-01-01

## 2018-01-01 RX ORDER — SODIUM CHLORIDE 9 MG/ML
25 INJECTION, SOLUTION INTRAVENOUS CONTINUOUS
Status: CANCELLED | OUTPATIENT
Start: 2018-11-19

## 2018-01-01 RX ORDER — HEPARIN 100 UNIT/ML
300-500 SYRINGE INTRAVENOUS AS NEEDED
Status: CANCELLED
Start: 2018-01-01

## 2018-01-01 RX ORDER — SODIUM CHLORIDE 9 MG/ML
10 INJECTION INTRAMUSCULAR; INTRAVENOUS; SUBCUTANEOUS AS NEEDED
Status: CANCELLED | OUTPATIENT
Start: 2018-11-26

## 2018-01-01 RX ORDER — SODIUM CHLORIDE 0.9 % (FLUSH) 0.9 %
10 SYRINGE (ML) INJECTION AS NEEDED
Status: CANCELLED
Start: 2018-01-01

## 2018-01-01 RX ORDER — SODIUM CHLORIDE 0.9 % (FLUSH) 0.9 %
5-10 SYRINGE (ML) INJECTION AS NEEDED
Status: DISCONTINUED | OUTPATIENT
Start: 2018-01-01 | End: 2018-01-01 | Stop reason: HOSPADM

## 2018-01-01 RX ORDER — DEXTROMETHORPHAN/PSEUDOEPHED 2.5-7.5/.8
1.2 DROPS ORAL
Status: DISCONTINUED | OUTPATIENT
Start: 2018-01-01 | End: 2018-01-01 | Stop reason: HOSPADM

## 2018-01-01 RX ORDER — HEPARIN 100 UNIT/ML
300-500 SYRINGE INTRAVENOUS AS NEEDED
Status: CANCELLED | OUTPATIENT
Start: 2018-01-01

## 2018-01-01 RX ORDER — SODIUM CHLORIDE 0.9 % (FLUSH) 0.9 %
5-10 SYRINGE (ML) INJECTION EVERY 8 HOURS
Status: DISCONTINUED | OUTPATIENT
Start: 2018-01-01 | End: 2018-01-01 | Stop reason: HOSPADM

## 2018-01-01 RX ORDER — HEPARIN 100 UNIT/ML
500 SYRINGE INTRAVENOUS AS NEEDED
Status: ACTIVE | OUTPATIENT
Start: 2018-01-01 | End: 2018-01-01

## 2018-01-01 RX ORDER — PROCHLORPERAZINE MALEATE 10 MG
10 TABLET ORAL
Qty: 30 TAB | Refills: 1 | Status: SHIPPED | OUTPATIENT
Start: 2018-01-01

## 2018-01-01 RX ORDER — HYDROCORTISONE SODIUM SUCCINATE 100 MG/2ML
100 INJECTION, POWDER, FOR SOLUTION INTRAMUSCULAR; INTRAVENOUS AS NEEDED
Status: CANCELLED | OUTPATIENT
Start: 2018-11-05

## 2018-01-01 RX ORDER — HEPARIN SODIUM 200 [USP'U]/100ML
400 INJECTION, SOLUTION INTRAVENOUS ONCE
Status: COMPLETED | OUTPATIENT
Start: 2018-01-01 | End: 2018-01-01

## 2018-01-01 RX ORDER — EPINEPHRINE 1 MG/ML
0.3 INJECTION, SOLUTION, CONCENTRATE INTRAVENOUS AS NEEDED
Status: CANCELLED | OUTPATIENT
Start: 2018-12-10

## 2018-01-01 RX ORDER — EPINEPHRINE 1 MG/ML
0.3 INJECTION, SOLUTION, CONCENTRATE INTRAVENOUS AS NEEDED
Status: CANCELLED | OUTPATIENT
Start: 2018-11-19

## 2018-01-01 RX ORDER — LIDOCAINE HYDROCHLORIDE 20 MG/ML
INJECTION, SOLUTION EPIDURAL; INFILTRATION; INTRACAUDAL; PERINEURAL AS NEEDED
Status: DISCONTINUED | OUTPATIENT
Start: 2018-01-01 | End: 2018-01-01 | Stop reason: HOSPADM

## 2018-01-01 RX ORDER — SODIUM CHLORIDE 0.9 % (FLUSH) 0.9 %
10 SYRINGE (ML) INJECTION AS NEEDED
Status: ACTIVE | OUTPATIENT
Start: 2018-01-01 | End: 2018-01-01

## 2018-01-01 RX ORDER — PALONOSETRON 0.05 MG/ML
0.25 INJECTION, SOLUTION INTRAVENOUS ONCE
Status: CANCELLED | OUTPATIENT
Start: 2018-01-01 | End: 2018-01-01

## 2018-01-01 RX ORDER — HEPARIN 100 UNIT/ML
300-500 SYRINGE INTRAVENOUS AS NEEDED
Status: CANCELLED
Start: 2018-11-05

## 2018-01-01 RX ORDER — SODIUM CHLORIDE 9 MG/ML
25 INJECTION, SOLUTION INTRAVENOUS CONTINUOUS
Status: CANCELLED | OUTPATIENT
Start: 2018-01-01 | End: 2018-01-01

## 2018-01-01 RX ORDER — MIDAZOLAM HYDROCHLORIDE 1 MG/ML
5 INJECTION, SOLUTION INTRAMUSCULAR; INTRAVENOUS
Status: DISCONTINUED | OUTPATIENT
Start: 2018-01-01 | End: 2018-01-01 | Stop reason: HOSPADM

## 2018-01-01 RX ORDER — DEXAMETHASONE SODIUM PHOSPHATE 4 MG/ML
INJECTION, SOLUTION INTRA-ARTICULAR; INTRALESIONAL; INTRAMUSCULAR; INTRAVENOUS; SOFT TISSUE AS NEEDED
Status: DISCONTINUED | OUTPATIENT
Start: 2018-01-01 | End: 2018-01-01 | Stop reason: HOSPADM

## 2018-01-01 RX ORDER — SODIUM CHLORIDE 9 MG/ML
250 INJECTION, SOLUTION INTRAVENOUS AS NEEDED
Status: DISPENSED | OUTPATIENT
Start: 2018-01-01 | End: 2018-01-01

## 2018-01-01 RX ORDER — PROPOFOL 10 MG/ML
INJECTION, EMULSION INTRAVENOUS AS NEEDED
Status: DISCONTINUED | OUTPATIENT
Start: 2018-01-01 | End: 2018-01-01 | Stop reason: HOSPADM

## 2018-01-01 RX ORDER — LIDOCAINE HYDROCHLORIDE 20 MG/ML
50 INJECTION, SOLUTION INFILTRATION; PERINEURAL ONCE
Status: COMPLETED | OUTPATIENT
Start: 2018-01-01 | End: 2018-01-01

## 2018-01-01 RX ORDER — DIPHENHYDRAMINE HYDROCHLORIDE 50 MG/ML
50 INJECTION, SOLUTION INTRAMUSCULAR; INTRAVENOUS AS NEEDED
Status: CANCELLED
Start: 2018-12-17

## 2018-01-01 RX ORDER — PALONOSETRON 0.05 MG/ML
0.25 INJECTION, SOLUTION INTRAVENOUS ONCE
Status: CANCELLED | OUTPATIENT
Start: 2018-11-05 | End: 2018-01-01

## 2018-01-01 RX ORDER — CEFAZOLIN SODIUM 1 G/3ML
INJECTION, POWDER, FOR SOLUTION INTRAMUSCULAR; INTRAVENOUS AS NEEDED
Status: DISCONTINUED | OUTPATIENT
Start: 2018-01-01 | End: 2018-01-01 | Stop reason: HOSPADM

## 2018-01-01 RX ORDER — SODIUM CHLORIDE 9 MG/ML
25 INJECTION, SOLUTION INTRAVENOUS CONTINUOUS
Status: CANCELLED | OUTPATIENT
Start: 2018-12-10

## 2018-01-01 RX ORDER — ONDANSETRON 2 MG/ML
8 INJECTION INTRAMUSCULAR; INTRAVENOUS AS NEEDED
Status: CANCELLED | OUTPATIENT
Start: 2018-12-10

## 2018-01-01 RX ORDER — DIPHENHYDRAMINE HYDROCHLORIDE 50 MG/ML
50 INJECTION, SOLUTION INTRAMUSCULAR; INTRAVENOUS AS NEEDED
Status: CANCELLED
Start: 2018-11-19

## 2018-01-01 RX ORDER — INDOMETHACIN 50 MG/1
50 CAPSULE ORAL 3 TIMES DAILY
COMMUNITY

## 2018-01-01 RX ORDER — SODIUM CHLORIDE 9 MG/ML
25 INJECTION, SOLUTION INTRAVENOUS CONTINUOUS
Status: DISCONTINUED | OUTPATIENT
Start: 2018-01-01 | End: 2018-01-01 | Stop reason: HOSPADM

## 2018-01-01 RX ORDER — SODIUM CHLORIDE 9 MG/ML
10 INJECTION INTRAMUSCULAR; INTRAVENOUS; SUBCUTANEOUS AS NEEDED
Status: DISCONTINUED | OUTPATIENT
Start: 2018-01-01 | End: 2018-01-01

## 2018-01-01 RX ORDER — ALBUTEROL SULFATE 0.83 MG/ML
2.5 SOLUTION RESPIRATORY (INHALATION) AS NEEDED
Status: CANCELLED
Start: 2018-12-17

## 2018-01-01 RX ORDER — ROCURONIUM BROMIDE 10 MG/ML
INJECTION, SOLUTION INTRAVENOUS AS NEEDED
Status: DISCONTINUED | OUTPATIENT
Start: 2018-01-01 | End: 2018-01-01 | Stop reason: HOSPADM

## 2018-01-01 RX ORDER — ACETAMINOPHEN 325 MG/1
650 TABLET ORAL AS NEEDED
Status: CANCELLED
Start: 2018-12-10

## 2018-01-01 RX ORDER — SODIUM CHLORIDE 9 MG/ML
50 INJECTION, SOLUTION INTRAVENOUS CONTINUOUS
Status: DISCONTINUED | OUTPATIENT
Start: 2018-01-01 | End: 2018-01-01 | Stop reason: HOSPADM

## 2018-01-01 RX ORDER — ONDANSETRON 2 MG/ML
8 INJECTION INTRAMUSCULAR; INTRAVENOUS AS NEEDED
Status: CANCELLED | OUTPATIENT
Start: 2018-11-19

## 2018-01-01 RX ORDER — POTASSIUM CHLORIDE 750 MG/1
40 TABLET, FILM COATED, EXTENDED RELEASE ORAL ONCE
Status: COMPLETED | OUTPATIENT
Start: 2018-01-01 | End: 2018-01-01

## 2018-01-01 RX ORDER — LIDOCAINE HYDROCHLORIDE AND EPINEPHRINE 10; 10 MG/ML; UG/ML
1.5 INJECTION, SOLUTION INFILTRATION; PERINEURAL ONCE
Status: COMPLETED | OUTPATIENT
Start: 2018-01-01 | End: 2018-01-01

## 2018-01-01 RX ORDER — SODIUM CHLORIDE 0.9 % (FLUSH) 0.9 %
10-40 SYRINGE (ML) INJECTION AS NEEDED
Status: ACTIVE | OUTPATIENT
Start: 2018-01-01 | End: 2018-01-01

## 2018-01-01 RX ORDER — ALBUTEROL SULFATE 0.83 MG/ML
2.5 SOLUTION RESPIRATORY (INHALATION) AS NEEDED
Status: CANCELLED
Start: 2018-11-19

## 2018-01-01 RX ORDER — HYDROCORTISONE SODIUM SUCCINATE 100 MG/2ML
100 INJECTION, POWDER, FOR SOLUTION INTRAMUSCULAR; INTRAVENOUS AS NEEDED
Status: CANCELLED | OUTPATIENT
Start: 2018-12-17

## 2018-01-01 RX ORDER — SODIUM CHLORIDE 9 MG/ML
10 INJECTION INTRAMUSCULAR; INTRAVENOUS; SUBCUTANEOUS AS NEEDED
Status: CANCELLED | OUTPATIENT
Start: 2018-11-05

## 2018-01-01 RX ORDER — FENTANYL CITRATE 50 UG/ML
INJECTION, SOLUTION INTRAMUSCULAR; INTRAVENOUS AS NEEDED
Status: DISCONTINUED | OUTPATIENT
Start: 2018-01-01 | End: 2018-01-01 | Stop reason: HOSPADM

## 2018-01-01 RX ORDER — SODIUM CHLORIDE 9 MG/ML
INJECTION, SOLUTION INTRAVENOUS
Status: DISCONTINUED | OUTPATIENT
Start: 2018-01-01 | End: 2018-01-01 | Stop reason: HOSPADM

## 2018-01-01 RX ORDER — SODIUM CHLORIDE 9 MG/ML
25 INJECTION, SOLUTION INTRAVENOUS CONTINUOUS
Status: CANCELLED | OUTPATIENT
Start: 2018-01-01

## 2018-01-01 RX ORDER — SODIUM CHLORIDE 0.9 % (FLUSH) 0.9 %
10 SYRINGE (ML) INJECTION AS NEEDED
Status: CANCELLED
Start: 2018-11-05

## 2018-01-01 RX ORDER — PALONOSETRON 0.05 MG/ML
0.25 INJECTION, SOLUTION INTRAVENOUS ONCE
Status: COMPLETED | OUTPATIENT
Start: 2018-01-01 | End: 2018-01-01

## 2018-01-01 RX ORDER — SODIUM CHLORIDE 9 MG/ML
25 INJECTION, SOLUTION INTRAVENOUS CONTINUOUS
Status: DISPENSED | OUTPATIENT
Start: 2018-01-01 | End: 2018-01-01

## 2018-01-01 RX ORDER — EPINEPHRINE 1 MG/ML
0.3 INJECTION, SOLUTION, CONCENTRATE INTRAVENOUS AS NEEDED
Status: CANCELLED | OUTPATIENT
Start: 2018-11-26

## 2018-01-01 RX ORDER — ONDANSETRON 2 MG/ML
INJECTION INTRAMUSCULAR; INTRAVENOUS AS NEEDED
Status: DISCONTINUED | OUTPATIENT
Start: 2018-01-01 | End: 2018-01-01 | Stop reason: HOSPADM

## 2018-01-01 RX ORDER — SODIUM CHLORIDE 0.9 % (FLUSH) 0.9 %
10 SYRINGE (ML) INJECTION AS NEEDED
Status: CANCELLED
Start: 2018-12-17

## 2018-01-01 RX ORDER — ONDANSETRON 2 MG/ML
8 INJECTION INTRAMUSCULAR; INTRAVENOUS AS NEEDED
Status: CANCELLED | OUTPATIENT
Start: 2018-11-05

## 2018-01-01 RX ORDER — CARVEDILOL 25 MG/1
25 TABLET ORAL 2 TIMES DAILY
COMMUNITY

## 2018-01-01 RX ORDER — DIPHENHYDRAMINE HYDROCHLORIDE 50 MG/ML
50 INJECTION, SOLUTION INTRAMUSCULAR; INTRAVENOUS AS NEEDED
Status: CANCELLED
Start: 2018-11-05

## 2018-01-01 RX ORDER — HEPARIN 100 UNIT/ML
300-500 SYRINGE INTRAVENOUS AS NEEDED
Status: DISCONTINUED | OUTPATIENT
Start: 2018-01-01 | End: 2018-01-01

## 2018-01-01 RX ORDER — NALOXONE HYDROCHLORIDE 0.4 MG/ML
0.4 INJECTION, SOLUTION INTRAMUSCULAR; INTRAVENOUS; SUBCUTANEOUS
Status: DISCONTINUED | OUTPATIENT
Start: 2018-01-01 | End: 2018-01-01 | Stop reason: HOSPADM

## 2018-01-01 RX ORDER — GLIPIZIDE 10 MG/1
10 TABLET ORAL 2 TIMES DAILY
COMMUNITY

## 2018-01-01 RX ORDER — MIDAZOLAM HYDROCHLORIDE 1 MG/ML
.25-1 INJECTION, SOLUTION INTRAMUSCULAR; INTRAVENOUS
Status: DISCONTINUED | OUTPATIENT
Start: 2018-01-01 | End: 2018-01-01 | Stop reason: HOSPADM

## 2018-01-01 RX ORDER — ALBUTEROL SULFATE 0.83 MG/ML
2.5 SOLUTION RESPIRATORY (INHALATION) AS NEEDED
Status: CANCELLED
Start: 2018-12-10

## 2018-01-01 RX ORDER — ALBUTEROL SULFATE 0.83 MG/ML
2.5 SOLUTION RESPIRATORY (INHALATION) AS NEEDED
Status: CANCELLED
Start: 2018-11-05

## 2018-01-01 RX ORDER — SODIUM CHLORIDE 9 MG/ML
10 INJECTION INTRAMUSCULAR; INTRAVENOUS; SUBCUTANEOUS AS NEEDED
Status: ACTIVE | OUTPATIENT
Start: 2018-01-01 | End: 2018-01-01

## 2018-01-01 RX ORDER — ACETAMINOPHEN 325 MG/1
650 TABLET ORAL AS NEEDED
Status: CANCELLED
Start: 2018-11-19

## 2018-01-01 RX ORDER — CEFAZOLIN SODIUM 1 G/3ML
INJECTION, POWDER, FOR SOLUTION INTRAMUSCULAR; INTRAVENOUS
Status: COMPLETED
Start: 2018-01-01 | End: 2018-01-01

## 2018-01-01 RX ORDER — DIPHENHYDRAMINE HYDROCHLORIDE 50 MG/ML
50 INJECTION, SOLUTION INTRAMUSCULAR; INTRAVENOUS AS NEEDED
Status: CANCELLED
Start: 2018-12-10

## 2018-01-01 RX ORDER — EPINEPHRINE 1 MG/ML
0.3 INJECTION, SOLUTION, CONCENTRATE INTRAVENOUS AS NEEDED
Status: CANCELLED | OUTPATIENT
Start: 2018-12-17

## 2018-01-01 RX ORDER — SODIUM CHLORIDE 9 MG/ML
10 INJECTION INTRAMUSCULAR; INTRAVENOUS; SUBCUTANEOUS AS NEEDED
Status: DISPENSED | OUTPATIENT
Start: 2018-01-01 | End: 2018-01-01

## 2018-01-01 RX ORDER — SODIUM CHLORIDE 0.9 % (FLUSH) 0.9 %
10 SYRINGE (ML) INJECTION AS NEEDED
Status: CANCELLED
Start: 2018-11-19

## 2018-01-01 RX ORDER — SUCCINYLCHOLINE CHLORIDE 20 MG/ML
INJECTION INTRAMUSCULAR; INTRAVENOUS AS NEEDED
Status: DISCONTINUED | OUTPATIENT
Start: 2018-01-01 | End: 2018-01-01 | Stop reason: HOSPADM

## 2018-01-01 RX ORDER — ALBUTEROL SULFATE 0.83 MG/ML
2.5 SOLUTION RESPIRATORY (INHALATION) AS NEEDED
Status: CANCELLED
Start: 2018-11-26

## 2018-01-01 RX ORDER — CEFAZOLIN SODIUM/WATER 2 G/20 ML
2 SYRINGE (ML) INTRAVENOUS ONCE
Status: COMPLETED | OUTPATIENT
Start: 2018-01-01 | End: 2018-01-01

## 2018-01-01 RX ORDER — HEPARIN 100 UNIT/ML
500 SYRINGE INTRAVENOUS AS NEEDED
Status: DISPENSED | OUTPATIENT
Start: 2018-01-01 | End: 2018-01-01

## 2018-01-01 RX ORDER — FENTANYL CITRATE 50 UG/ML
INJECTION, SOLUTION INTRAMUSCULAR; INTRAVENOUS
Status: COMPLETED
Start: 2018-01-01 | End: 2018-01-01

## 2018-01-01 RX ORDER — SODIUM CHLORIDE 0.9 % (FLUSH) 0.9 %
10 SYRINGE (ML) INJECTION
Status: COMPLETED | OUTPATIENT
Start: 2018-01-01 | End: 2018-01-01

## 2018-01-01 RX ORDER — HEPARIN 100 UNIT/ML
300-500 SYRINGE INTRAVENOUS AS NEEDED
Status: CANCELLED
Start: 2018-11-19

## 2018-01-01 RX ORDER — SODIUM CHLORIDE 9 MG/ML
10 INJECTION INTRAMUSCULAR; INTRAVENOUS; SUBCUTANEOUS AS NEEDED
Status: CANCELLED | OUTPATIENT
Start: 2018-12-17

## 2018-01-01 RX ORDER — HYDROCORTISONE SODIUM SUCCINATE 100 MG/2ML
100 INJECTION, POWDER, FOR SOLUTION INTRAMUSCULAR; INTRAVENOUS AS NEEDED
Status: CANCELLED | OUTPATIENT
Start: 2018-11-19

## 2018-01-01 RX ORDER — LIDOCAINE AND PRILOCAINE 25; 25 MG/G; MG/G
CREAM TOPICAL AS NEEDED
Qty: 30 G | Refills: 0 | Status: SHIPPED | OUTPATIENT
Start: 2018-01-01

## 2018-01-01 RX ORDER — SODIUM CHLORIDE 9 MG/ML
25 INJECTION, SOLUTION INTRAVENOUS CONTINUOUS
Status: CANCELLED | OUTPATIENT
Start: 2018-12-17

## 2018-01-01 RX ORDER — ONDANSETRON 2 MG/ML
8 INJECTION INTRAMUSCULAR; INTRAVENOUS AS NEEDED
Status: CANCELLED | OUTPATIENT
Start: 2018-11-26

## 2018-01-01 RX ORDER — SODIUM CHLORIDE 9 MG/ML
250 INJECTION, SOLUTION INTRAVENOUS AS NEEDED
Status: DISCONTINUED | OUTPATIENT
Start: 2018-01-01 | End: 2018-01-01 | Stop reason: HOSPADM

## 2018-01-01 RX ORDER — SODIUM CHLORIDE 9 MG/ML
50 INJECTION, SOLUTION INTRAVENOUS
Status: COMPLETED | OUTPATIENT
Start: 2018-01-01 | End: 2018-01-01

## 2018-01-01 RX ORDER — HEPARIN 100 UNIT/ML
300-500 SYRINGE INTRAVENOUS AS NEEDED
Status: CANCELLED
Start: 2018-11-26

## 2018-01-01 RX ORDER — EPINEPHRINE 1 MG/ML
0.3 INJECTION, SOLUTION, CONCENTRATE INTRAVENOUS AS NEEDED
Status: CANCELLED | OUTPATIENT
Start: 2018-11-05

## 2018-01-01 RX ORDER — SODIUM CHLORIDE 9 MG/ML
10 INJECTION INTRAMUSCULAR; INTRAVENOUS; SUBCUTANEOUS AS NEEDED
Status: CANCELLED | OUTPATIENT
Start: 2018-11-19

## 2018-01-01 RX ORDER — PALONOSETRON 0.05 MG/ML
0.25 INJECTION, SOLUTION INTRAVENOUS ONCE
Status: CANCELLED | OUTPATIENT
Start: 2018-12-17 | End: 2018-12-03

## 2018-01-01 RX ORDER — DIPHENHYDRAMINE HYDROCHLORIDE 50 MG/ML
50 INJECTION, SOLUTION INTRAMUSCULAR; INTRAVENOUS AS NEEDED
Status: CANCELLED
Start: 2018-11-26

## 2018-01-01 RX ORDER — PALONOSETRON 0.05 MG/ML
0.25 INJECTION, SOLUTION INTRAVENOUS ONCE
Status: CANCELLED | OUTPATIENT
Start: 2018-11-26 | End: 2018-11-12

## 2018-01-01 RX ORDER — LIDOCAINE HYDROCHLORIDE 10 MG/ML
10 INJECTION, SOLUTION EPIDURAL; INFILTRATION; INTRACAUDAL; PERINEURAL
Status: ACTIVE | OUTPATIENT
Start: 2018-01-01 | End: 2018-01-01

## 2018-01-01 RX ORDER — METFORMIN HYDROCHLORIDE 500 MG/1
500 TABLET ORAL 2 TIMES DAILY
COMMUNITY

## 2018-01-01 RX ORDER — HYDROCORTISONE SODIUM SUCCINATE 100 MG/2ML
100 INJECTION, POWDER, FOR SOLUTION INTRAMUSCULAR; INTRAVENOUS AS NEEDED
Status: CANCELLED | OUTPATIENT
Start: 2018-12-10

## 2018-01-01 RX ORDER — SODIUM CHLORIDE 9 MG/ML
10 INJECTION INTRAMUSCULAR; INTRAVENOUS; SUBCUTANEOUS AS NEEDED
Status: CANCELLED | OUTPATIENT
Start: 2018-12-10

## 2018-01-01 RX ORDER — POTASSIUM CHLORIDE 750 MG/1
10 TABLET, EXTENDED RELEASE ORAL DAILY
Qty: 30 TAB | Refills: 1 | Status: SHIPPED | OUTPATIENT
Start: 2018-01-01

## 2018-01-01 RX ORDER — ACETAMINOPHEN 325 MG/1
650 TABLET ORAL AS NEEDED
Status: CANCELLED
Start: 2018-11-05

## 2018-01-01 RX ORDER — HEPARIN 100 UNIT/ML
300-500 SYRINGE INTRAVENOUS AS NEEDED
Status: ACTIVE | OUTPATIENT
Start: 2018-01-01 | End: 2018-01-01

## 2018-01-01 RX ORDER — SODIUM CHLORIDE 0.9 % (FLUSH) 0.9 %
10 SYRINGE (ML) INJECTION AS NEEDED
Status: CANCELLED | OUTPATIENT
Start: 2018-01-01

## 2018-01-01 RX ORDER — ACETAMINOPHEN 325 MG/1
650 TABLET ORAL AS NEEDED
Status: CANCELLED
Start: 2018-11-26

## 2018-01-01 RX ORDER — HEPARIN 100 UNIT/ML
300-500 SYRINGE INTRAVENOUS AS NEEDED
Status: CANCELLED
Start: 2018-12-10

## 2018-01-01 RX ORDER — PALONOSETRON 0.05 MG/ML
0.25 INJECTION, SOLUTION INTRAVENOUS ONCE
Status: CANCELLED | OUTPATIENT
Start: 2018-12-10 | End: 2018-11-26

## 2018-01-01 RX ORDER — SODIUM CHLORIDE 9 MG/ML
25 INJECTION, SOLUTION INTRAVENOUS CONTINUOUS
Status: CANCELLED | OUTPATIENT
Start: 2018-11-05

## 2018-01-01 RX ORDER — SODIUM CHLORIDE 0.9 % (FLUSH) 0.9 %
10 SYRINGE (ML) INJECTION AS NEEDED
Status: CANCELLED
Start: 2018-11-26

## 2018-01-01 RX ORDER — EPHEDRINE SULFATE 50 MG/ML
INJECTION, SOLUTION INTRAVENOUS
Status: DISCONTINUED
Start: 2018-01-01 | End: 2018-01-01 | Stop reason: HOSPADM

## 2018-01-01 RX ORDER — ONDANSETRON 4 MG/1
4 TABLET, ORALLY DISINTEGRATING ORAL
Qty: 30 TAB | Refills: 2 | Status: SHIPPED | OUTPATIENT
Start: 2018-01-01

## 2018-01-01 RX ORDER — SODIUM CHLORIDE 0.9 % (FLUSH) 0.9 %
10 SYRINGE (ML) INJECTION AS NEEDED
Status: CANCELLED
Start: 2018-12-10

## 2018-01-01 RX ORDER — ONDANSETRON 2 MG/ML
8 INJECTION INTRAMUSCULAR; INTRAVENOUS AS NEEDED
Status: CANCELLED | OUTPATIENT
Start: 2018-12-17

## 2018-01-01 RX ORDER — HYDROCORTISONE SODIUM SUCCINATE 100 MG/2ML
100 INJECTION, POWDER, FOR SOLUTION INTRAMUSCULAR; INTRAVENOUS AS NEEDED
Status: CANCELLED | OUTPATIENT
Start: 2018-11-26

## 2018-01-01 RX ORDER — HEPARIN 100 UNIT/ML
300 SYRINGE INTRAVENOUS ONCE
Status: COMPLETED | OUTPATIENT
Start: 2018-01-01 | End: 2018-01-01

## 2018-01-01 RX ORDER — SODIUM CHLORIDE 9 MG/ML
25 INJECTION, SOLUTION INTRAVENOUS CONTINUOUS
Status: CANCELLED | OUTPATIENT
Start: 2018-11-26

## 2018-01-01 RX ORDER — PALONOSETRON 0.05 MG/ML
0.25 INJECTION, SOLUTION INTRAVENOUS ONCE
Status: CANCELLED | OUTPATIENT
Start: 2018-11-19 | End: 2018-11-05

## 2018-01-01 RX ORDER — PHENYLEPHRINE HCL IN 0.9% NACL 0.4MG/10ML
SYRINGE (ML) INTRAVENOUS AS NEEDED
Status: DISCONTINUED | OUTPATIENT
Start: 2018-01-01 | End: 2018-01-01 | Stop reason: HOSPADM

## 2018-01-01 RX ORDER — ACETAMINOPHEN 325 MG/1
650 TABLET ORAL AS NEEDED
Status: CANCELLED
Start: 2018-12-17

## 2018-01-01 RX ADMIN — CEFAZOLIN SODIUM 2 G: 1 INJECTION, POWDER, FOR SOLUTION INTRAMUSCULAR; INTRAVENOUS at 08:25

## 2018-01-01 RX ADMIN — MIDAZOLAM 1 MG: 1 INJECTION INTRAMUSCULAR; INTRAVENOUS at 11:14

## 2018-01-01 RX ADMIN — MIDAZOLAM 0.5 MG: 1 INJECTION INTRAMUSCULAR; INTRAVENOUS at 11:22

## 2018-01-01 RX ADMIN — Medication 2 G: at 11:06

## 2018-01-01 RX ADMIN — MIDAZOLAM 1 MG: 1 INJECTION INTRAMUSCULAR; INTRAVENOUS at 11:08

## 2018-01-01 RX ADMIN — ONDANSETRON 4 MG: 2 INJECTION INTRAMUSCULAR; INTRAVENOUS at 15:50

## 2018-01-01 RX ADMIN — HEPARIN SODIUM IN SODIUM CHLORIDE 800 UNITS: 200 INJECTION INTRAVENOUS at 11:34

## 2018-01-01 RX ADMIN — SODIUM CHLORIDE 25 ML/HR: 900 INJECTION, SOLUTION INTRAVENOUS at 10:14

## 2018-01-01 RX ADMIN — MAGNESIUM SULFATE HEPTAHYDRATE: 500 INJECTION, SOLUTION INTRAMUSCULAR; INTRAVENOUS at 10:40

## 2018-01-01 RX ADMIN — LIDOCAINE HYDROCHLORIDE 40 MG: 20 INJECTION, SOLUTION INFILTRATION; PERINEURAL at 11:33

## 2018-01-01 RX ADMIN — Medication 80 MCG: at 08:21

## 2018-01-01 RX ADMIN — CISPLATIN 43 MG: 1 INJECTION, SOLUTION INTRAVENOUS at 14:40

## 2018-01-01 RX ADMIN — SODIUM CHLORIDE 150 MG: 900 INJECTION, SOLUTION INTRAVENOUS at 12:55

## 2018-01-01 RX ADMIN — Medication 10 ML: at 10:16

## 2018-01-01 RX ADMIN — MIDAZOLAM 1 MG: 1 INJECTION INTRAMUSCULAR; INTRAVENOUS at 09:54

## 2018-01-01 RX ADMIN — Medication 500 UNITS: at 16:29

## 2018-01-01 RX ADMIN — DEXAMETHASONE SODIUM PHOSPHATE 4 MG: 4 INJECTION, SOLUTION INTRA-ARTICULAR; INTRALESIONAL; INTRAMUSCULAR; INTRAVENOUS; SOFT TISSUE at 08:09

## 2018-01-01 RX ADMIN — SODIUM CHLORIDE 250 ML: 900 INJECTION, SOLUTION INTRAVENOUS at 10:19

## 2018-01-01 RX ADMIN — Medication 500 UNITS: at 14:10

## 2018-01-01 RX ADMIN — FENTANYL CITRATE 25 MCG: 50 INJECTION, SOLUTION INTRAMUSCULAR; INTRAVENOUS at 10:21

## 2018-01-01 RX ADMIN — Medication 80 MCG: at 08:27

## 2018-01-01 RX ADMIN — PROPOFOL 130 MG: 10 INJECTION, EMULSION INTRAVENOUS at 07:52

## 2018-01-01 RX ADMIN — POTASSIUM CHLORIDE: 2 INJECTION, SOLUTION, CONCENTRATE INTRAVENOUS at 11:21

## 2018-01-01 RX ADMIN — DIPHENHYDRAMINE HYDROCHLORIDE 25 MG: 25 CAPSULE ORAL at 10:01

## 2018-01-01 RX ADMIN — FENTANYL CITRATE 25 MCG: 50 INJECTION, SOLUTION INTRAMUSCULAR; INTRAVENOUS at 11:08

## 2018-01-01 RX ADMIN — PROPOFOL 150 MG: 10 INJECTION, EMULSION INTRAVENOUS at 15:20

## 2018-01-01 RX ADMIN — Medication 10 ML: at 15:37

## 2018-01-01 RX ADMIN — SODIUM CHLORIDE, PRESERVATIVE FREE 500 UNITS: 5 INJECTION INTRAVENOUS at 13:48

## 2018-01-01 RX ADMIN — SODIUM CHLORIDE 10 ML: 9 INJECTION INTRAMUSCULAR; INTRAVENOUS; SUBCUTANEOUS at 09:11

## 2018-01-01 RX ADMIN — DEXAMETHASONE SODIUM PHOSPHATE 12 MG: 4 INJECTION, SOLUTION INTRA-ARTICULAR; INTRALESIONAL; INTRAMUSCULAR; INTRAVENOUS; SOFT TISSUE at 12:58

## 2018-01-01 RX ADMIN — CEFAZOLIN SODIUM 2 G: 1 INJECTION, POWDER, FOR SOLUTION INTRAMUSCULAR; INTRAVENOUS at 15:33

## 2018-01-01 RX ADMIN — POTASSIUM CHLORIDE: 2 INJECTION, SOLUTION, CONCENTRATE INTRAVENOUS at 14:24

## 2018-01-01 RX ADMIN — ACETAMINOPHEN 650 MG: 325 TABLET ORAL at 10:43

## 2018-01-01 RX ADMIN — SODIUM CHLORIDE: 9 INJECTION, SOLUTION INTRAVENOUS at 15:12

## 2018-01-01 RX ADMIN — FERRIC CARBOXYMALTOSE INJECTION 750 MG: 50 INJECTION, SOLUTION INTRAVENOUS at 10:50

## 2018-01-01 RX ADMIN — ROCURONIUM BROMIDE 5 MG: 10 INJECTION, SOLUTION INTRAVENOUS at 07:52

## 2018-01-01 RX ADMIN — Medication 80 MCG: at 08:29

## 2018-01-01 RX ADMIN — FENTANYL CITRATE 25 MCG: 50 INJECTION, SOLUTION INTRAMUSCULAR; INTRAVENOUS at 11:18

## 2018-01-01 RX ADMIN — SODIUM CHLORIDE 25 ML/HR: 900 INJECTION, SOLUTION INTRAVENOUS at 10:48

## 2018-01-01 RX ADMIN — ONDANSETRON 4 MG: 2 INJECTION INTRAMUSCULAR; INTRAVENOUS at 08:09

## 2018-01-01 RX ADMIN — DEXAMETHASONE SODIUM PHOSPHATE 12 MG: 4 INJECTION, SOLUTION INTRA-ARTICULAR; INTRALESIONAL; INTRAMUSCULAR; INTRAVENOUS; SOFT TISSUE at 12:28

## 2018-01-01 RX ADMIN — FENTANYL CITRATE 25 MCG: 50 INJECTION, SOLUTION INTRAMUSCULAR; INTRAVENOUS at 10:13

## 2018-01-01 RX ADMIN — Medication 10 ML: at 10:05

## 2018-01-01 RX ADMIN — ROCURONIUM BROMIDE 5 MG: 10 INJECTION, SOLUTION INTRAVENOUS at 15:20

## 2018-01-01 RX ADMIN — SUCCINYLCHOLINE CHLORIDE 140 MG: 20 INJECTION INTRAMUSCULAR; INTRAVENOUS at 07:53

## 2018-01-01 RX ADMIN — LIDOCAINE HYDROCHLORIDE 50 MG: 20 INJECTION, SOLUTION EPIDURAL; INFILTRATION; INTRACAUDAL; PERINEURAL at 07:52

## 2018-01-01 RX ADMIN — DEXAMETHASONE SODIUM PHOSPHATE 4 MG: 4 INJECTION, SOLUTION INTRA-ARTICULAR; INTRALESIONAL; INTRAMUSCULAR; INTRAVENOUS; SOFT TISSUE at 15:32

## 2018-01-01 RX ADMIN — FENTANYL CITRATE 25 MCG: 50 INJECTION, SOLUTION INTRAMUSCULAR; INTRAVENOUS at 09:54

## 2018-01-01 RX ADMIN — Medication 10 ML: at 13:48

## 2018-01-01 RX ADMIN — Medication 10 ML: at 11:45

## 2018-01-01 RX ADMIN — POTASSIUM CHLORIDE: 2 INJECTION, SOLUTION, CONCENTRATE INTRAVENOUS at 14:42

## 2018-01-01 RX ADMIN — SODIUM CHLORIDE 10 ML: 9 INJECTION INTRAMUSCULAR; INTRAVENOUS; SUBCUTANEOUS at 10:16

## 2018-01-01 RX ADMIN — FENTANYL CITRATE 25 MCG: 50 INJECTION, SOLUTION INTRAMUSCULAR; INTRAVENOUS at 11:04

## 2018-01-01 RX ADMIN — SODIUM CHLORIDE 10 ML: 9 INJECTION INTRAMUSCULAR; INTRAVENOUS; SUBCUTANEOUS at 10:19

## 2018-01-01 RX ADMIN — Medication 10 ML: at 16:30

## 2018-01-01 RX ADMIN — POTASSIUM CHLORIDE 40 MEQ: 750 TABLET, FILM COATED, EXTENDED RELEASE ORAL at 11:57

## 2018-01-01 RX ADMIN — SODIUM CHLORIDE 250 ML: 900 INJECTION, SOLUTION INTRAVENOUS at 10:49

## 2018-01-01 RX ADMIN — DARBEPOETIN ALFA 500 MCG: 500 INJECTION, SOLUTION INTRAVENOUS; SUBCUTANEOUS at 11:47

## 2018-01-01 RX ADMIN — PALONOSETRON 0.25 MG: 0.05 INJECTION, SOLUTION INTRAVENOUS at 12:27

## 2018-01-01 RX ADMIN — PALONOSETRON 0.25 MG: 0.05 INJECTION, SOLUTION INTRAVENOUS at 13:37

## 2018-01-01 RX ADMIN — LIDOCAINE HYDROCHLORIDE 100 MG: 20 INJECTION, SOLUTION EPIDURAL; INFILTRATION; INTRACAUDAL; PERINEURAL at 15:20

## 2018-01-01 RX ADMIN — SUCCINYLCHOLINE CHLORIDE 140 MG: 20 INJECTION INTRAMUSCULAR; INTRAVENOUS at 15:20

## 2018-01-01 RX ADMIN — Medication 10 ML: at 14:10

## 2018-01-01 RX ADMIN — DARBEPOETIN ALFA 500 MCG: 500 INJECTION, SOLUTION INTRAVENOUS; SUBCUTANEOUS at 14:13

## 2018-01-01 RX ADMIN — CISPLATIN 34.4 MG: 1 INJECTION, SOLUTION INTRAVENOUS at 14:21

## 2018-01-01 RX ADMIN — SODIUM CHLORIDE 250 ML: 900 INJECTION, SOLUTION INTRAVENOUS at 10:35

## 2018-01-01 RX ADMIN — MIDAZOLAM 1 MG: 1 INJECTION INTRAMUSCULAR; INTRAVENOUS at 10:21

## 2018-01-01 RX ADMIN — SODIUM CHLORIDE 25 ML/HR: 900 INJECTION, SOLUTION INTRAVENOUS at 12:26

## 2018-01-01 RX ADMIN — MIDAZOLAM 1 MG: 1 INJECTION INTRAMUSCULAR; INTRAVENOUS at 10:13

## 2018-01-01 RX ADMIN — SODIUM CHLORIDE 10 ML: 9 INJECTION INTRAMUSCULAR; INTRAVENOUS; SUBCUTANEOUS at 13:10

## 2018-01-01 RX ADMIN — FERRIC CARBOXYMALTOSE INJECTION 750 MG: 50 INJECTION, SOLUTION INTRAVENOUS at 10:15

## 2018-01-01 RX ADMIN — SODIUM CHLORIDE 50 ML/HR: 900 INJECTION, SOLUTION INTRAVENOUS at 15:37

## 2018-01-01 RX ADMIN — SODIUM CHLORIDE 10 ML: 9 INJECTION INTRAMUSCULAR; INTRAVENOUS; SUBCUTANEOUS at 10:30

## 2018-01-01 RX ADMIN — ACETAMINOPHEN 650 MG: 325 TABLET ORAL at 10:01

## 2018-01-01 RX ADMIN — Medication 10 ML: at 09:18

## 2018-01-01 RX ADMIN — SODIUM CHLORIDE, PRESERVATIVE FREE 500 UNITS: 5 INJECTION INTRAVENOUS at 16:30

## 2018-01-01 RX ADMIN — SODIUM CHLORIDE 1720 MG: 900 INJECTION, SOLUTION INTRAVENOUS at 13:35

## 2018-01-01 RX ADMIN — SODIUM CHLORIDE 1376 MG: 900 INJECTION, SOLUTION INTRAVENOUS at 13:44

## 2018-01-01 RX ADMIN — DIPHENHYDRAMINE HYDROCHLORIDE 25 MG: 25 CAPSULE ORAL at 10:43

## 2018-01-01 RX ADMIN — Medication 10 ML: at 10:30

## 2018-01-01 RX ADMIN — SODIUM CHLORIDE 150 MG: 900 INJECTION, SOLUTION INTRAVENOUS at 13:13

## 2018-01-01 RX ADMIN — MAGNESIUM SULFATE HEPTAHYDRATE: 500 INJECTION, SOLUTION INTRAMUSCULAR; INTRAVENOUS at 11:23

## 2018-01-01 RX ADMIN — SODIUM CHLORIDE 10 ML: 9 INJECTION INTRAMUSCULAR; INTRAVENOUS; SUBCUTANEOUS at 10:05

## 2018-01-01 RX ADMIN — Medication 500 UNITS: at 11:45

## 2018-01-01 RX ADMIN — DIPHENHYDRAMINE HYDROCHLORIDE 25 MG: 25 CAPSULE ORAL at 10:30

## 2018-01-01 RX ADMIN — MIDAZOLAM 0.5 MG: 1 INJECTION INTRAMUSCULAR; INTRAVENOUS at 11:19

## 2018-01-01 RX ADMIN — POTASSIUM CHLORIDE: 2 INJECTION, SOLUTION, CONCENTRATE INTRAVENOUS at 11:50

## 2018-01-01 RX ADMIN — POTASSIUM CHLORIDE: 2 INJECTION, SOLUTION, CONCENTRATE INTRAVENOUS at 13:00

## 2018-01-01 RX ADMIN — Medication 10 ML: at 16:29

## 2018-01-01 RX ADMIN — IOPAMIDOL 10 ML: 612 INJECTION, SOLUTION INTRAVENOUS at 16:01

## 2018-01-01 RX ADMIN — SODIUM CHLORIDE: 9 INJECTION, SOLUTION INTRAVENOUS at 15:54

## 2018-01-01 RX ADMIN — IOPAMIDOL 100 ML: 755 INJECTION, SOLUTION INTRAVENOUS at 15:37

## 2018-01-01 RX ADMIN — GADOBUTROL 6 ML: 604.72 INJECTION INTRAVENOUS at 11:53

## 2018-01-01 RX ADMIN — ACETAMINOPHEN 650 MG: 325 TABLET ORAL at 10:30

## 2018-01-01 RX ADMIN — Medication 80 MCG: at 08:24

## 2018-01-01 RX ADMIN — FENTANYL CITRATE 75 MCG: 50 INJECTION, SOLUTION INTRAMUSCULAR; INTRAVENOUS at 07:52

## 2018-01-01 RX ADMIN — FENTANYL CITRATE 25 MCG: 50 INJECTION, SOLUTION INTRAMUSCULAR; INTRAVENOUS at 10:27

## 2018-01-01 RX ADMIN — SODIUM CHLORIDE, PRESERVATIVE FREE 500 UNITS: 5 INJECTION INTRAVENOUS at 13:10

## 2018-01-01 RX ADMIN — SODIUM CHLORIDE 10 ML: 9 INJECTION INTRAMUSCULAR; INTRAVENOUS; SUBCUTANEOUS at 10:18

## 2018-01-01 RX ADMIN — SODIUM CHLORIDE, PRESERVATIVE FREE 300 UNITS: 5 INJECTION INTRAVENOUS at 11:34

## 2018-01-01 RX ADMIN — MIDAZOLAM 1 MG: 1 INJECTION INTRAMUSCULAR; INTRAVENOUS at 11:04

## 2018-01-01 RX ADMIN — LIDOCAINE HYDROCHLORIDE AND EPINEPHRINE 15 MG: 10; 10 INJECTION, SOLUTION INFILTRATION; PERINEURAL at 11:34

## 2018-08-15 PROBLEM — K82.8 GALLBLADDER MASS: Status: ACTIVE | Noted: 2018-01-01

## 2018-08-15 PROBLEM — R17 JAUNDICE: Status: ACTIVE | Noted: 2018-01-01

## 2018-08-15 NOTE — PROGRESS NOTES
[facility] Surgery History and Physical    Chief Complaint: jaundice, gallbladder mass    History of Present Illness:      Joy Hoyos is a 70 y.o. female who was kindly referred by Dr. Shonna Bal for evaluation of jaundice and apparent gallbladder mass suspicious for metastatic gallbladder cancer. The patient states she has noticed she was jaundiced now for 2-3 days but has had dark colored urine for about 2 months now. She was initially told to drink more water but the dark colored urine did not improve. She has also now started to notice gurjit colored stools that are somewhat loose and diffuse itching for the past week or so. She has had some nausea and vomiting but is able to eat. Her appetite is reduced over the past week and she has lost 2-3 lbs. She denies any abdominal pain, fevers or other complaints. She had a colonoscopy a couple of years ago but has never had an EGD. Her father  of pancreatic cancer at age 80 but no other family history of cancer. She has not had prior issues with her gallbladder. Past Medical History:   Diagnosis Date    Diabetes ([address]     Hypertension     Other ill-defined conditions(799.89)     lipid    Personal history of colonic polyps 2016    Tubular adenoma        Past Surgical History:   Procedure Laterality Date    COLONOSCOPY N/A 2016    COL[facility] by Dominic Hernandez [facility] at [facility] ENDOSCOPY    HX BACK SURGERY      HX BLANCHE AND BSO      HX TONSILLECTOMY      [place] COLSC FLX W/REMOVAL LESION BY HOT BX FORCEPS  2011            Social History     Social History    Marital status:      Spouse name: N/A    Number of children: N/A    Years of education: N/A     Occupational History    Not on file.      Social History Main Topics    Smoking status: Former Smoker     Packs/day: 0.25     Years: 15.00     Quit date: 1986    Smokeless tobacco: Never Used    Alcohol use No    Drug use: No    Sexual activity: Not on file     Other Topics Concern    Not on file     Social History Narrative       Family History   Problem Relation Age of Onset    Heart Disease Mother     Cancer Father      metastatic cancer         Current Outpatient Prescriptions:     linagliptin (TRADJENTA) 5 mg tablet, Take 5 mg by mouth daily. , Disp: , Rfl:     glipiZIDE (GLUCOTROL) 5 mg tablet, Take 5 mg by mouth two (2) times a day., Disp: , Rfl:     fluticasone (FLONASE) 50 mcg/actuation nasal spray, 2 Sprays by Both Nostrils route as needed. , Disp: , Rfl:     metformin (GLUCOPHAGE) 1,000 mg tablet, Take 500 mg by mouth two (2) times daily (with meals). , Disp: , Rfl:     simvastatin (ZOCOR) 20 mg tablet, Take 20 mg by mouth nightly., Disp: , Rfl:     carvedilol (COREG) 12.5 mg tablet, Take 12.5 mg by mouth two (2) times daily (with meals). , Disp: , Rfl:     aspirin 81 mg tablet, Take 81 mg by mouth daily. , Disp: , Rfl:     lisinopril-hydrochlorothiazide (PRINZIDE, ZESTORETIC) 20-12.5 mg per tablet, Take 1 Tab by mouth daily (with breakfast). , Disp: , Rfl:     Allergies   Allergen Reactions    Lisinopril Swelling     Lips and tongue    Sulfa (Sulfonamide Antibiotics) Itching       ROS   Constitutional: negative, 2 lb weight loss  Ears, Nose, Mouth, Throat, and Face: negative  Respiratory: negative  Cardiovascular: negative  Gastrointestinal: See HPI  Genitourinary:dark colored urine  Integument/Breast: negative  Hematologic/Lymphatic: negative  Behavioral/Psychiatric: negative  Allergic/Immunologic: negative      Physical Exam:     Visit Vitals    /70 (BP 1 Location: Left arm, BP Patient Position: Sitting)    Pulse 88    Temp 98.6 °F (37 °C) (Oral)    Resp 18    Ht 5' 8\" (1.727 m)    Wt 143 lb (64.9 kg)    SpO2 97%    BMI 21.74 kg/m2       General - alert and oriented, no apparent distress. Jaundiced. HEENT - NC/AT.  + scleral icterus. Pulm - CTAB, normal inspiratory effort  CV - RRR, no M/R/G  Abd - soft, NT, ND.   No palpable masses or organomegaly. Well healed hysterectomy incision. No evidence of hernia. Ext - warm, well perfused, no edema  Lymphatics - no cervical, supraclavicular or inguinal adenopathy noted. Skin - supple, no rashes  Psychiatric - normal affect, good mood    Labs  WBC 8.1  Hgb 9.1  Hct 27.9  Plt 370    T Bili 9.8      Alk Phos 721  Albumin 3.3  BUN 17  Cr 1.18  INR 1.0  PT 10.7      Imaging  18 CT A/P: IMPRESSION:   1. 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 The  appearance is most consistent with gallbladder carcinoma. There is also a second  small lesion in the right lobe of the liver consistent with metastasis. 2. Atherosclerotic abdominal aorta without aneurysm. 3. Status post hysterectomy. 4. Diverticulosis. I have reviewed and agree with all of the pertinent images    Assessment:     Bravo Lugo is a 70 y.o. female with painless jaundice and CT imaging suggestive of metastatic gallbladder cancer. Recommendations:     1. I have referred the patient to GI for ERCP to relieve her biliary obstruction and for brushings/biopsies. I do believe this likely represents a gallbladder cancer based upon the imaging and presentation. If this is not successful, she may require a PTC catheter. I have asked her to hold her 81 mg ASA until the ERCP is completed. 2.   Referral to Medical Oncology as well with the concern that this is metastatic and unresectable gallbladder cancer based both upon the locally advanced appearance of the primary tumor as well as likely metastatic lesion noted on CT. 3.  CA 19-9 and repeat CMP to see how quickly her bilirubin is rising. 4.  Tumor board discussion.     5.  I think she will need a CT chest to complete staging but will wait until we have biopsy information and to see if she needs CT guided biopsy of the liver lesion should endoscopic biopsy not be diagnostic. 6.   I have scheduled her to follow up with me in 2 weeks, however this may not be necessary if the diagnosis shows gallbladder cancer and her duct is able to be addressed endoscopically as her treatment recommendation would be for chemotherapy rather than surgery. 50 mins of time was spent with the patient of which > 50% of the time involved face-to-face counseling of the patient regarding the proposed treatment plan.       Laly Romero MD  8/15/2018    CC: Forest Gottron, MD Dr. Sidra Dance Dr. Cindia Level Dr. Bobby Shay

## 2018-08-15 NOTE — MR AVS SNAPSHOT
[address] [facility] [address] [facility] [address] [address] 
[phone] Patient: Brittney Mendez MRN: [id number] FPJ:[birth year] Visit Information Date & Time Provider Department Dept. Phone Encounter #  
 8/15/2018  3:20 PM Justen Hercules [phone] [id number] Follow-up Instructions Return in about 2 weeks (around 8/29/2018). Routing History Your Appointments 8/30/2018  9:00 AM  
ESTABLISHED PATIENT with MD Justen Hercules [phone] ([address]) Appt Note: EP; 2week follow up. . $0CP $0PB/GSSM/apa/08-  
 [address] [facility] [address] [facility] [address] [facility] [address]  
[address] [place] [address] Upcoming Health Maintenance Date Due DTaP/Tdap/Td series (1 - Tdap) 5/4/1963 ZOSTER VACCINE AGE 60> 3/4/2002 GLAUCOMA SCREENING Q2Y 5/4/2007 Bone Densitometry (Dexa) Screening 5/4/2007 Pneumococcal 65+ Low/Medium Risk (1 of 2 - PCV13) 5/4/2007 MEDICARE YEARLY EXAM 3/20/2018 Influenza Age 5 to Adult 8/1/2018 Allergies as of 8/15/2018  Review Complete On: 8/15/2018 By: Dusty Thurston MD  
  
 Severity Noted Reaction Type Reactions Lisinopril  08/15/2018    Swelling Lips and tongue Sulfa (Sulfonamide Antibiotics)  06/08/2011   Side Effect Itching Current Immunizations  Never Reviewed No immunizations on file. Not reviewed this visit You Were Diagnosed With   
  
 Codes Comments Gallbladder cancer ([facility] [address]    -  Primary ICD-10-CM: C23 
ICD-9-CM: 156.0 Jaundice     ICD-10-CM: R17 
ICD-9-CM: 235. 4 Vitals BP Pulse Temp Resp Height(growth percentile) Weight(growth percentile) 132/70 (BP 1 Location: Left arm, BP Patient Position: Sitting) 88 98.6 °F (37 °C) (Oral) 18 5' 8\" (1.727 m) 143 lb (64.9 kg) SpO2 BMI OB Status Smoking Status 97% 21.74 kg/m2 Hysterectomy Former Smoker Vitals History BMI and BSA Data Body Mass Index Body Surface Area 21.74 kg/m 2 1.76 m 2 Preferred Pharmacy Pharmacy Name Phone Anisa Zavala [address] [place] [address]. [phone] Your Updated Medication List  
  
   
This list is accurate as of 8/15/18  5:10 PM.  Always use your most recent med list.  
  
  
  
  
 aspirin 81 mg tablet Take 81 mg by mouth daily. carvedilol 12.5 mg tablet Commonly known as:  Jakub Been Take 12.5 mg by mouth two (2) times daily (with meals). FLONASE 50 mcg/actuation nasal spray Generic drug:  fluticasone 2 Sprays by Both Nostrils route as needed. glipiZIDE 5 mg tablet Commonly known as:  Ana Dayhoff Take 5 mg by mouth two (2) times a day. lisinopril-hydroCHLOROthiazide 20-12.5 mg per tablet Commonly known as:  Shruthi Boxer Take 1 Tab by mouth daily (with breakfast). metFORMIN 1,000 mg tablet Commonly known as:  GLUCOPHAGE Take 500 mg by mouth two (2) times daily (with meals). simvastatin 20 mg tablet Commonly known as:  ZOCOR Take 20 mg by mouth nightly. TRADJENTA 5 mg tablet Generic drug:  linagliptin Take 5 mg by mouth daily. We Performed the Following CANCER AG 19-9 [[id number] Custom] CBC W/O DIFF [70795 CPT(R)] METABOLIC PANEL, COMPREHENSIVE [06210 CPT(R)] REFERRAL TO GASTROENTEROLOGY [ZBT94 Custom] Comments:  
 Jaundice with suspected metastatic gallbladder cancer REFERRAL TO ONCOLOGY [GGA44 Custom] Comments:  
 Suspected metastatic gallbladder cancer Follow-up Instructions Return in about 2 weeks (around 8/29/2018). Referral Information Referral ID Referred By Referred To  
  
 [id number] NISA RUSH [facility] [address] [facility] Tricia Justyn [phone] Eleazar Charles Visits Status Start Date End Date 1 New Request 8/15/18 8/15/19 If your referral has a status of pending review or denied, additional information will be sent to support the outcome of this decision. Referral ID Referred By Referred To  
 [id number] Valentina RUSH MD  
   [address] [facility] [address], [address] Phone: [phone] Fax: [phone] Visits Status Start Date End Date 1 New Request 8/15/18 8/15/19 If your referral has a status of pending review or denied, additional information will be sent to support the outcome of this decision. Introducing \[facility]\"" & HEALTH SERVICES! Dear Hernan Castillo: Thank you for requesting a Happlink account. Our records indicate that you already have an active Happlink account. You can access your account anytime at [email or web address] Did you know that you can access your hospital and ER discharge instructions at any time in Happlink? You can also review all of your test results from your hospital stay or ER visit. Additional Information If you have questions, please visit the Frequently Asked Questions section of the Happlink website at [email or web address] Remember, Happlink is NOT to be used for urgent needs. For medical emergencies, dial 911. Now available from your iPhone and Android! Please provide this summary of care documentation to your next provider. Your primary care clinician is listed as Dwayne Sharma. If you have any questions after today's visit, please call [phone].

## 2018-08-15 NOTE — TELEPHONE ENCOUNTER
Spoke with Ms Gonzalez. Dr Jake Napier reviewed CT abdomen & pelvis dated 8/13/18 & is referring pt to surgical oncology at [facility]. Appointment made today with Dr Santos Begun at 3:00 pm.  Notes received from PCP, Dr Antonio Jerez faxed to Dr Colton Pineda office, confirmation received. Also notified Dr Bergman's office of appt with Dr Karen Khan. Dr Colton Pineda office called back & states Ms Gonzalez will be seeing Dr Isacc Marinelli today. Pt notified of change.

## 2018-08-15 NOTE — PROGRESS NOTES
1. Have you been to the ER, urgent care clinic since your last visit? Hospitalized since your last visit? 7/30/18 HD free standing, Swelling of the tongue. Reaction to Lisinopril. 7/5/18 [facility] right upper lip swelling. 2. Have you seen or consulted any other health care providers outside of the [facility] since your last visit? Include any pap smears or colon screening.  Yes, PCP

## 2018-08-20 NOTE — PERIOP NOTES
Stent placement: LOT: [id number], exp: 07/08/2020, REF: [id number]/ 10F x 12    . Endoscope was pre-cleaned at bedside immediately following procedure by Bogdan Haines

## 2018-08-20 NOTE — PROCEDURES
[address] [facility].  [address] [address] [address], [address]   [phone]                   ERCP NOTE    NAME:  Leighton Castro   :   1940   MRN:   [id number]     Date/Time:  2018 8:40 AM    Procedure Type:   ERCPwith biliary sphincterotomy, biliary stent placement, biliary dilation, biliary tissue sampling     Indications: abnormal CT/MRCP, biliary obstruction, jaundice  Pre-operative Diagnosis: see indication above  Post-operative Diagnosis:  See findings below  : Rexann Lennox, MD    Referring Provider:    Wesley Fair MD, Néstor West MD    Sedation:  General anesthesia    Procedure Details:  After informed consent was obtained with all risks and benefits of procedure explained, the patient was taken to the fluoroscopy suite and placed in the prone position. Upon sequential sedation as per above, the Olympus duodenoscope [id number]   was inserted via the mouthpeice and carefully advanced to the second portion of the duodenum. The quality of visualization was good. The duodenoscope was withdrawn into a short position. Findings:   Esophagus: not examined in detail  Stomach: not examined in detail  Duodenum/jejunum: normal  Ampulla:small   Cholangiogram: Bile duct was selectively cannulated using Dreamwire 0.035 in advanced into the left intrahepatic duct. Contrast was injected. A stricture was noted in the common hepatic duct about 1 cm distal to the biliary confluence. The stricture extended about 1 cm into the left hepatic duct. The right hepatic duct did not fill at all. Biliary sphincterotomy was performed using ERBE. Dilation of the stricture was performed using 4 mm X 4 cm Hurricane balloon. Subsequently, biliary brushings were obtained. A 10 Fr X 12 cm Advanix biliary stent with single external and single internal flap was placed successfully. Stent was in good position and bile flowed through it.    Pancreatogram:not performed    Specimen Removed: * No specimens in log *    Complications: None. EBL:  None. Interventions:    Pancreatic: none  Biliary: Bile duct was selectively cannulated using Dreamwire 0.035 in advanced into the left intrahepatic duct. Contrast was injected. A stricture was noted in the common hepatic duct about 1 cm distal to the biliary confluence. The stricture extended about 1 cm into the left hepatic duct. The right hepatic duct did not fill at all. Biliary sphincterotomy was performed using ERBE. Dilation of the stricture was performed using 4 mm X 4 cm Hurricane balloon. Subsequently, biliary brushings were obtained. A 10 Fr X 12 cm Advanix biliary stent with single external and single internal flap was placed successfully. Stent was in good position and bile flowed through it. Impression:  -Hilar bile duct stricture-sampled and stented    Recommendations:    Clear liquid diet and advance as tolerated. Resume normal medication(s).   NO aspirin for 7 days    Watch for complications  Start Levaquin 500 mg 1 PO X 7 days  ERCP with stent change in 2-3 months  Follow up with Dr Krzysztof Tompkins as scheduled      Discharge Disposition:  Home following recovery in [facility] Bahman Bustillos MD  8/20/2018  8:40 AM

## 2018-08-20 NOTE — ANESTHESIA POSTPROCEDURE EVALUATION
Post-Anesthesia Evaluation and Assessment    Patient: Bess Connolly MRN: [id number]  SSN: [id number]    YOB: 1940  Age: 70 y.o. Sex: female       Cardiovascular Function/Vital Signs  Visit Vitals    /54    Pulse 92    Temp 36.6 °C (97.8 °F)    Resp 16    Wt 63.5 kg (140 lb)    SpO2 100%    BMI 21.29 kg/m2       Patient is status post general anesthesia for Procedure(s):  ENDOSCOPIC RETROGRADE CHOLANGIOPANCREATOGRAPHY (ERCP)  ENDOSCOPIC SPHINCTEROTOMY  ENDOSCOPIC STONE EXTRACTION/BALLOON SWEEP  ENDOSCOPIC BRUSHING  BILIARY STENT PLACEMENT. Nausea/Vomiting: None    Postoperative hydration reviewed and adequate. Pain:  Pain Scale 1: Numeric (0 - 10) (08/20/18 0911)  Pain Intensity 1: 0 (08/20/18 0911)   Managed    Neurological Status: At baseline    Mental Status and Level of Consciousness: Arousable    Pulmonary Status:   O2 Device: Room air (08/20/18 0911)   Adequate oxygenation and airway patent    Complications related to anesthesia: None    Post-anesthesia assessment completed.  No concerns    Signed By: Avery Martinez MD     August 20, 2018

## 2018-08-20 NOTE — H&P
[address] [facility].  [address] [facility] [address]  [phone]                                History and Physical     NAME: Isra Joseph   :  1940   MRN:  [id number]     HPI:  The patient was seen and examined. Past Surgical History:   Procedure Laterality Date    COLONOSCOPY N/A 2016    [facility] ENDOSCOPY    HX BACK SURGERY      HX BLANCHE AND BSO      HX TONSILLECTOMY       W[place] Bradford FLX W/REMOVAL LESION BY HOT BX FORCEPS  2011          Past Medical History:   Diagnosis Date    Diabetes ([address]     Hypertension     Ill-defined condition     increased cholesterol    Other ill-defined conditions(799.89)     lipid    Personal history of colonic polyps 2016    Tubular adenoma      Social History   Substance Use Topics    Smoking status: Former Smoker     Packs/day: 0.25     Years: 15.00     Quit date: 1986    Smokeless tobacco: Never Used    Alcohol use No     Allergies   Allergen Reactions    Lisinopril Swelling     Lips and tongue    Sulfa (Sulfonamide Antibiotics) Itching     Family History   Problem Relation Age of Onset    Heart Disease Mother     Cancer Father      metastatic cancer - prostate     Current Facility-Administered Medications   Medication Dose Route Frequency    ePHEDrine (MISTOLE) 50 mg/mL injection        fentaNYL citrate (PF) 50 mcg/mL injection        glucagon (GLUCAGEN) injection 1 mg  1 mg IntraVENous ONCE         PHYSICAL EXAM:  General: WD, WN. Alert, cooperative, no acute distress    HEENT: NC, Atraumatic. PERRLA, EOMI. Anicteric sclerae. Lungs:  CTA Bilaterally. No Wheezing/Rhonchi/Rales. Heart:  Regular  rhythm,  No murmur, No Rubs, No Gallops  Abdomen: Soft, Non distended, Non tender.  +Bowel sounds, no HSM  Extremities: No c/c/e  Neurologic:  CN 2-12 gi, Alert and oriented X 3. No acute neurological distress   Psych:   Good insight. Not anxious nor agitated.     The heart, lungs and mental status were satisfactory for the administration of GA and for the procedure.       Mallampati score: 3       Assessment:   · Bile duct obs    Plan:   · Endoscopic procedure  · GA

## 2018-08-20 NOTE — ROUTINE PROCESS
Elliott Perkins  1940  [id number]    Situation:  Verbal report received from: Melanie RN  Procedure: Procedure(s):  ENDOSCOPIC RETROGRADE CHOLANGIOPANCREATOGRAPHY (ERCP)  ENDOSCOPIC SPHINCTEROTOMY  ENDOSCOPIC STONE EXTRACTION/BALLOON SWEEP  ENDOSCOPIC BRUSHING  BILIARY STENT PLACEMENT    Background:    Preoperative diagnosis: Pancreatic CA  Postoperative diagnosis: Hilar stricture, dilated brushed and stented    :  Dr. Baker Peer  Assistant(s): Endoscopy Technician-1: Fabricio Gallo  Endoscopy RN-1: Earnestine Regalado RN    Specimens: * No specimens in log *  H. Pylori  no    Assessment:  Intra-procedure medications       Anesthesia gave intra-procedure sedation and medications, see anesthesia flow sheet yes    Intravenous fluids: NS@ KVO     Vital signs stable     Abdominal assessment: round and soft     Recommendation:  Discharge patient per MD order.     Family or Friend   Permission to share finding with family or friend yes

## 2018-08-20 NOTE — IP AVS SNAPSHOT
1111 [facility] [address] [place] [address] 
[phone] Patient: Bses Connolly MRN: [id number] JESSI:[birth year] A check con indicates which time of day the medication should be taken. My Medications CONTINUE taking these medications Instructions Each Dose to Equal  
 Morning Noon Evening Bedtime ALLEGRA PO Your last dose was: Your next dose is: Take  by mouth. 24 hour. Takes one po daily as needed. aspirin 81 mg tablet Your last dose was: Your next dose is: Take 81 mg by mouth daily. 81 mg  
    
   
   
   
  
 carvedilol 25 mg tablet Commonly known as:  Boby Ellabell Your last dose was: Your next dose is: Take 25 mg by mouth two (2) times a day. 25 mg  
    
   
   
   
  
 glipiZIDE 10 mg tablet Commonly known as:  Ant Luke Your last dose was: Your next dose is: Take 10 mg by mouth two (2) times a day. 10 mg  
    
   
   
   
  
 metFORMIN 500 mg tablet Commonly known as:  GLUCOPHAGE Your last dose was: Your next dose is: Take 500 mg by mouth two (2) times a day. 500 mg  
    
   
   
   
  
 OTHER Your last dose was: Your next dose is:    
   
   
 Nasacort allergies 24 hr. Uses 2 sprays both nostrils as needed. simvastatin 20 mg tablet Commonly known as:  ZOCOR Your last dose was: Your next dose is: Take 20 mg by mouth nightly. 20 mg  
    
   
   
   
  
 TRADJENTA 5 mg tablet Generic drug:  linagliptin Your last dose was: Your next dose is: Take 5 mg by mouth daily. 5 mg

## 2018-08-20 NOTE — DISCHARGE INSTRUCTIONS
Endoscopic Retrograde Cholangiopancreatogram (ERCP): What to Expect at [address] [facility]  After you have an endoscopic retrograde cholangiopancreatogram (ERCP), you probably will stay at the hospital or clinic for 1 to 2 hours. This will allow the medicine to wear off. You will be able to go home after your doctor or a nurse checks to make sure you are not having any problems. If you stay in the hospital overnight, you may go home the next day. You may have a sore throat for a day or two after the procedure. This care sheet gives you a general idea about how long it will take for you to recover. But each person recovers at a different pace. Follow the steps below to get better as quickly as possible. How can you care for yourself at home? Activity    · Rest as much as you need to after you go home.     · You should be able to go back to your usual activities the day after the procedure. Diet    · Follow your doctor's directions for eating after the procedure.     · Drink plenty of fluids (unless your doctor tells you not to). Medicines    · Your doctor will tell you if and when you can restart your medicines. He or she will also give you instructions about taking any new medicines.     · If you take blood thinners, such as warfarin (Coumadin), clopidogrel (Plavix), or aspirin, be sure to talk to your doctor. He or she will tell you if and when to start taking those medicines again. Make sure that you understand exactly what your doctor wants you to do.     · If you have a sore throat the next day, use an over-the-counter spray to numb your throat. Be safe with medicines. Read and follow all instructions on the label. Follow-up care is a key part of your treatment and safety. Be sure to make and go to all appointments, and call your doctor if you are having problems. It's also a good idea to know your test results and keep a list of the medicines you take. When should you call for help?   Call [phone] anytime you think you may need emergency care. For example, call if:    · You passed out (lost consciousness).     · Your stools are maroon or very bloody.     · You have trouble breathing.    Call your doctor now or go to the emergency room if:    · You have new or worse belly pain.     · You have pain that does not get better after you take pain medicine.     · You have a fever.     · You cannot pass stools or gas.     · You are sick to your stomach or cannot hold down fluids.     · You have blood in your stools.    Watch closely for changes in your health, and be sure to contact your doctor if:    · Your throat still hurts after a day or two.     · You do not get better as expected. Where can you learn more? Go to http://gayla-fernando.info/. Enter W077 in the search box to learn more about \"Endoscopic Retrograde Cholangiopancreatogram (ERCP): What to Expect at Home. \"  Current as of: May 12, 2017  Content Version: 11.7  © 0967-2604 Peonut. Care instructions adapted under license by [employer] (which disclaims liability or warranty for this information). If you have questions about a medical condition or this instruction, always ask your healthcare professional. [place] [address] any warranty or liability for your use of this information.   Claudia Valdez [address] 786 [address]  [phone]  1940    DISCOMFORT:  Sore throat-  warm salt water gargle  redness at IV site- apply warm compress to area; if redness or soreness persist- contact your physician  Gaseous discomfort- walking, belching will help relieve any discomfort  You may not operate a vehicle for 12 hours  You may not engage in an occupation involving machinery or appliances for rest of today  You may not drink alcoholic beverages for at least 12 hours  Avoid making any critical decisions for at least 24 hour  DIET  You may eat and drink after you leave. You may resume your regular diet - however -  remember your colon is empty and a heavy meal will produce gas. Avoid these foods:  vegetables, fried / greasy foods, carbonated drinks    ACTIVITY  You may resume your normal daily activities   Spend the remainder of the day resting -  avoid any strenuous activity. CALL M.D. ANY SIGN OF   Increasing pain, nausea, vomiting  Abdominal distension (swelling)  New increased bleeding (oral or rectal)  Fever (chills)  Pain in chest area    Shortness of breath    Follow-up Instructions:   Call Dr. Talon Palma for any questions or problems. we took a biopsy please call the office within 2 weeks to discuss your  pathology results. Telephone # [phone]        Continue same medications. ENDOSCOPY FINDINGS:   Your endoscopy showed bile duct stricture.

## 2018-08-20 NOTE — IP AVS SNAPSHOT
[address] 
[phone] Patient: Marcelino De La Cruz MRN: [id number] QZH:[birth year] About your hospitalization You were admitted on:  August 20, 2018 [facility] You were discharged on:  August 20, 2018 Why you were hospitalized Your primary diagnosis was:  Not on File Follow-up Information None Your Scheduled Appointments Thursday August 30, 2018  9:00 AM EDT  
ESTABLISHED PATIENT with MD Justen Rod [phone] ([facility] [address] [facility] [address] [phone]  
[phone] Thursday August 30, 2018  2:00 PM EDT New Patient with Enrique Malone MD  
[address] [facility] Oncology at [facility]) [address] [facility] [address] [facility]  
[phone] Discharge Orders None A check con indicates which time of day the medication should be taken. My Medications CONTINUE taking these medications Instructions Each Dose to Equal  
 Morning Noon Evening Bedtime ALLEGRA PO Your last dose was: Your next dose is: Take  by mouth. 24 hour. Takes one po daily as needed. aspirin 81 mg tablet Your last dose was: Your next dose is: Take 81 mg by mouth daily. 81 mg  
    
   
   
   
  
 carvedilol 25 mg tablet Commonly known as:  Monda Lovings Your last dose was: Your next dose is: Take 25 mg by mouth two (2) times a day. 25 mg  
    
   
   
   
  
 glipiZIDE 10 mg tablet Commonly known as:  Wagner Ubaldo Your last dose was: Your next dose is: Take 10 mg by mouth two (2) times a day. 10 mg  
    
   
   
   
  
 metFORMIN 500 mg tablet Commonly known as:  GLUCOPHAGE  
   
 Your last dose was: Your next dose is: Take 500 mg by mouth two (2) times a day. 500 mg  
    
   
   
   
  
 OTHER Your last dose was: Your next dose is:    
   
   
 Nasacort allergies 24 hr. Uses 2 sprays both nostrils as needed. simvastatin 20 mg tablet Commonly known as:  ZOCOR Your last dose was: Your next dose is: Take 20 mg by mouth nightly. 20 mg  
    
   
   
   
  
 TRADJENTA 5 mg tablet Generic drug:  linagliptin Your last dose was: Your next dose is: Take 5 mg by mouth daily. 5 mg Discharge Instructions Endoscopic Retrograde Cholangiopancreatogram (ERCP): What to Expect at [facility] Your Recovery After you have an endoscopic retrograde cholangiopancreatogram (ERCP), you probably will stay at the hospital or clinic for 1 to 2 hours. This will allow the medicine to wear off. You will be able to go home after your doctor or a nurse checks to make sure you are not having any problems. If you stay in the hospital overnight, you may go home the next day. You may have a sore throat for a day or two after the procedure. This care sheet gives you a general idea about how long it will take for you to recover. But each person recovers at a different pace. Follow the steps below to get better as quickly as possible. How can you care for yourself at home? Activity 
  · Rest as much as you need to after you go home.  
  · You should be able to go back to your usual activities the day after the procedure. Diet 
  · Follow your doctor's directions for eating after the procedure.  
  · Drink plenty of fluids (unless your doctor tells you not to). Medicines 
  · Your doctor will tell you if and when you can restart your medicines. He or she will also give you instructions about taking any new medicines.   · If you take blood thinners, such as warfarin (Coumadin), clopidogrel (Plavix), or aspirin, be sure to talk to your doctor. He or she will tell you if and when to start taking those medicines again. Make sure that you understand exactly what your doctor wants you to do.  
  · If you have a sore throat the next day, use an over-the-counter spray to numb your throat. Be safe with medicines. Read and follow all instructions on the label. Follow-up care is a key part of your treatment and safety. Be sure to make and go to all appointments, and call your doctor if you are having problems. It's also a good idea to know your test results and keep a list of the medicines you take. When should you call for help? Call 911 anytime you think you may need emergency care. For example, call if: 
  · You passed out (lost consciousness).  
  · Your stools are maroon or very bloody.  
  · You have trouble breathing.  
 Call your doctor now or go to the emergency room if: 
  · You have new or worse belly pain.  
  · You have pain that does not get better after you take pain medicine.  
  · You have a fever.  
  · You cannot pass stools or gas.  
  · You are sick to your stomach or cannot hold down fluids.  
  · You have blood in your stools.  
 Watch closely for changes in your health, and be sure to contact your doctor if: 
  · Your throat still hurts after a day or two.  
  · You do not get better as expected. Where can you learn more? Go to http://gayla-fernando.info/. Enter L431 in the search box to learn more about \"Endoscopic Retrograde Cholangiopancreatogram (ERCP): What to Expect at Home. \" Current as of: May 12, 2017 Content Version: 11.7 © 5862-3236 [employer]ouv[employer]. Care instructions adapted under license by [employer] (which disclaims liability or warranty for this information).  If you have questions about a medical condition or this instruction, always ask your healthcare professional. [place] [address] any warranty or liability for your use of this information. [address]. 
[address] [facility] [address] [place], [address] 
[phone] DISCHARGE INSTRUCTIONS [place] [phone] 
1940 DISCOMFORT: 
Sore throat-  warm salt water gargle 
redness at IV site- apply warm compress to area; if redness or soreness persist- contact your physician Gaseous discomfort- walking, belching will help relieve any discomfort You may not operate a vehicle for 12 hours You may not engage in an occupation involving machinery or appliances for rest of today You may not drink alcoholic beverages for at least 12 hours Avoid making any critical decisions for at least 24 hour DIET You may eat and drink after you leave. You may resume your regular diet  however -  remember your colon is empty and a heavy meal will produce gas. Avoid these foods:  vegetables, fried / greasy foods, carbonated drinks ACTIVITY You may resume your normal daily activities Spend the remainder of the day resting -  avoid any strenuous activity. CALL M.D. ANY SIGN OF Increasing pain, nausea, vomiting Abdominal distension (swelling) New increased bleeding (oral or rectal) Fever (chills) Pain in chest area Shortness of breath Follow-up Instructions: 
 Call Dr. Noelle Yeung for any questions or problems. we took a biopsy please call the office within 2 weeks to discuss your  pathology results. Telephone # [phone] Continue same medications. ENDOSCOPY FINDINGS: 
 Your endoscopy showed bile duct stricture. Introducing [facility] [place] & HEALTH SERVICES! Dear Ruma Mendez: Thank you for requesting a Wish Days account. Our records indicate that you already have an active Wish Days account. You can access your account anytime at [email or web address] Did you know that you can access your hospital and ER discharge instructions at any time in Invision.com? You can also review all of your test results from your hospital stay or ER visit. Additional Information If you have questions, please visit the Frequently Asked Questions section of the Tubular Labst website at [email or web address] Remember, Invision.com is NOT to be used for urgent needs. For medical emergencies, dial 911. Now available from your iPhone and Android! Introducing Aron Gresham As a Checkout10 patient, I wanted to make you aware of our electronic visit tool called Aron Gresham. Gloople/Seafarers CV allows you to connect within minutes with a medical provider 24 hours a day, seven days a week via a mobile device or tablet or logging into a secure website from your computer. You can access Aron Gresham from anywhere in the [place]. A virtual visit might be right for you when you have a simple condition and feel like you just dont want to get out of bed, or cant get away from work for an appointment, when your regular [employer] [facility] provider is not available (evenings, weekends or holidays), or when youre out of town and need minor care. Electronic visits cost only $49 and if the Gloople/Seafarers CV provider determines a prescription is needed to treat your condition, one can be electronically transmitted to a nearby pharmacy*. Please take a moment to enroll today if you have not already done so. The enrollment process is free and takes just a few minutes. To enroll, please download the Gloople/Seafarers CV kim to your tablet or phone, or visit [email or web address]."[email or web address] Media[employer] [email or web address].". org to enroll on your computer. And, as an [address] [place] [address] patient with a Effdon account, the results of your visits will be scanned into your electronic medical record and your primary care provider will be able to view the scanned results. We urge you to continue to see your regular Mackenzie Olivarez provider for your ongoing medical care. And while your primary care provider may not be the one available when you seek a Aron Haganfin virtual visit, the peace of mind you get from getting a real diagnosis real time can be priceless. For more information on Aron Quaeroreymundofin, view our Frequently Asked Questions (FAQs) at [email or web address]. Sincerely, 
 
Obey Dumont MD 
Chief Medical Officer [employer] *:  certain medications cannot be prescribed via Aron BentleyQijia [employer] and Technology Unresulted Labs-Please follow up with your PCP about these lab tests Order Current Status XR ENDO ERCP COMP BILIARY PANC SI In process Providers Seen During Your Hospitalization Provider Specialty Primary office phone Nidhi Pete MD Gastroenterology [phone] Your Primary Care Physician (PCP) Primary Care Physician Office Phone Office Fax [address] [place] [phone] You are allergic to the following Allergen Reactions Lisinopril Swelling Lips and tongue Sulfa (Sulfonamide Antibiotics) Itching Recent Documentation Weight BMI OB Status Smoking Status 63.5 kg 21.29 kg/m2 Hysterectomy Former Smoker Emergency Contacts Name Discharge Info Relation Home Work Mobile ENDYMION 145 CAREGIVER [3] Spouse [3] [phone] Patient Belongings The following personal items are in your possession at time of discharge: 
                             
 
  
  
 Please provide this summary of care documentation to your next provider. Signatures-by signing, you are acknowledging that this After Visit Summary has been reviewed with you and you have received a copy. Patient Signature:  ____________________________________________________________ Date:  ____________________________________________________________  
  
Sydney Roxie Provider Signature:  ____________________________________________________________ Date:  ____________________________________________________________

## 2018-08-30 NOTE — MR AVS SNAPSHOT
[address] [facility] [address] [phone] 
[phone] Patient: Cassie Beltran MRN: [id number] ORE:[birth year] Visit Information Date & Time Provider Department Dept. Phone Encounter #  
 8/30/2018  9:00 AM MD Justen Brown [phone] [id number] Follow-up Instructions Return if symptoms worsen or fail to improve. Routing History Your Appointments 9/19/2018  9:30 AM  
ESTABLISHED PATIENT with Napoleon Khan MD  
[address] Oncology at [facility]) Appt Note: onc f/u  
 [address] [facility] [address] [id number]  
[address] [place] [address] [address] [facility] [address] Holland Lan Upcoming Health Maintenance Date Due DTaP/Tdap/Td series (1 - Tdap) 5/4/1963 ZOSTER VACCINE AGE 60> 3/4/2002 GLAUCOMA SCREENING Q2Y 5/4/2007 Bone Densitometry (Dexa) Screening 5/4/2007 Pneumococcal 65+ High/Highest Risk (1 of 2 - PCV13) 5/4/2007 MEDICARE YEARLY EXAM 3/20/2018 Influenza Age 5 to Adult 8/1/2018 Allergies as of 8/30/2018  Review Complete On: 8/30/2018 By: Colletta Schiff, RN Severity Noted Reaction Type Reactions Lisinopril  08/15/2018    Swelling Lips and tongue Sulfa (Sulfonamide Antibiotics)  06/08/2011   Side Effect Itching Current Immunizations  Reviewed on 9/5/2018 No immunizations on file. Not reviewed this visit You Were Diagnosed With   
  
 Codes Comments Liver mass    -  Primary ICD-10-CM: R16.0 ICD-9-CM: 573.9 Gallbladder mass     ICD-10-CM: K82.8 ICD-9-CM: 575.8 Vitals BP Pulse Temp Resp Height(growth percentile) Weight(growth percentile) 128/78 (BP 1 Location: Right arm, BP Patient Position: Sitting) 79 98.7 °F (37.1 °C) (Oral) 18 5' 8\" (1.727 m) 136 lb (61.7 kg) SpO2 BMI OB Status Smoking Status 97% 20.68 kg/m2 Hysterectomy Former Smoker Vitals History BMI and BSA Data Body Mass Index Body Surface Area  
 20.68 kg/m 2 1.72 m 2 Preferred Pharmacy Pharmacy Name Phone Kristin Blizzard [address] [place] [address] Dr Mike, [address] [place] [address]. [phone] Your Updated Medication List  
  
   
This list is accurate as of 8/30/18 11:59 PM.  Always use your most recent med list. ALLEGRA PO Take  by mouth. 24 hour. Takes one po daily as needed. aspirin 81 mg tablet Take 81 mg by mouth daily. carvedilol 25 mg tablet Commonly known as:  Layman Mohamud Take 25 mg by mouth two (2) times a day. glipiZIDE 10 mg tablet Commonly known as:  Selestine Israel Take 10 mg by mouth two (2) times a day. lidocaine-prilocaine topical cream  
Commonly known as:  EMLA Apply  to affected area as needed for Pain.  
  
 metFORMIN 500 mg tablet Commonly known as:  GLUCOPHAGE Take 500 mg by mouth two (2) times a day. ondansetron 4 mg disintegrating tablet Commonly known as:  ZOFRAN ODT Take 1 Tab by mouth every eight (8) hours as needed for Nausea. OTHER Nasacort allergies 24 hr. Uses 2 sprays both nostrils as needed. prochlorperazine 10 mg tablet Commonly known as:  COMPAZINE Take 1 Tab by mouth every six (6) hours as needed for up to 30 doses. simvastatin 20 mg tablet Commonly known as:  ZOCOR Take 20 mg by mouth nightly. TRADJENTA 5 mg tablet Generic drug:  linagliptin Take 5 mg by mouth daily. Follow-up Instructions Return if symptoms worsen or fail to improve. To-Do List   
 08/31/2018 Imaging:  CT BX LIVER NDL PERC   
  
 09/07/2018 10:00 AM  
  Appointment with [address] at [facility] ([phone])  
  
 09/12/2018 8:30 AM  
  Appointment with Ryan Aviles MD; [facility] CT 3 at [facility] ([phone]) DIET RESTRICTIONS 1.  For invasive and sedation procedures, patient should be NPO 8 hours prior to exam, unless instructed otherwise. 2. Take all medications not requiring food with sip of water, excluding blood thinners and diabetic medications. GENERAL INSTRUCTIONS 1. Bring a list of all medications you are currently taking, including over the counter medications. 2. Blood thinners and platelet inhibitors must be stopped 3-5 days prior to procedure. Consult your ordering physician prior to stopping them. 3. Check in at registration 1 hour before your appointment time unless you were instructed to do otherwise. 4. If sedation is required, you must have a  present before procedure is started. 5. The procedure may last 1-1 ½ hours. You may be required to stay 4-6 hours after the procedure for observation. 6. Bring any [facility] films/images pertaining to the area of interest with you on the day of appointment. 09/12/2018 9:30 AM  
  Appointment with Chelo Dawson MD; [address] at \[facility]\"" IR ([phone]) DIET RESTRICTIONS NPO, do not eat or drink 8 hours prior to test. Take all medications not requiring food with sip of water, excluding blood thinners and diabetic medications. GENERAL INSTRUCTIONS 1. Bring any [facility] facility films/images pertaining to the area of interest with you on the day of appointment. 2. Bring a list of all medications you are currently taking, including over the counter medications. 3. A valid order with Physicians signature is required for all scheduled tests. 4. Blood thinners and platelet inhibitors must be stopped 3-5 days prior to procedure. Consult your ordering physician prior to stopping them. 5. Time given is ARRIVAL time, not procedure time. 6. You must have a  present before a procedure is started. 7. The procedure may last 1-1 ½ hours. You may be required to stay 4-6 hours after the procedure for observation. If you have any questions please direct them to your ordering physician. Introducing \[facility] [place] [facility]\"" & HEALTH SERVICES! Dear Erik Perkins: Thank you for requesting a Terranova account. Our records indicate that you already have an active Terranova account. You can access your account anytime at [email or web address] Group Did you know that you can access your hospital and ER discharge instructions at any time in Terranova? You can also review all of your test results from your hospital stay or ER visit. Additional Information If you have questions, please visit the Frequently Asked Questions section of the Terranova website at [email or web address] Remember, Terranova is NOT to be used for urgent needs. For medical emergencies, dial 911. Now available from your iPhone and Android! Please provide this summary of care documentation to your next provider. Your primary care clinician is listed as Salvador Barron. If you have any questions after today's visit, please call [phone].

## 2018-08-30 NOTE — PROGRESS NOTES
1. Have you been to the ER, urgent care clinic since your last visit? Hospitalized since your last visit? No 
 
2. Have you seen or consulted any other health care providers outside of the [facility] since your last visit? Include any pap smears or colon screening.  No

## 2018-08-30 NOTE — PROGRESS NOTES
Pt is a 70 yr old pt here as a new Pt . She had a CT scan which showed abnormal gall bladder, she had \"brushings\" done by Dr Cade Kirkland which were inconclusive. A stent was placed at that time. Pt went back to see Dr Sarah Matute today and is scheduling biopsy. Pt appears jaundice today, itching a small amount but is much improved. Pt denies pain, not much of an appetite, has had a 2 pound weight loss. Pt is here with  today. Blood pressure 142/75, pulse 80, temperature 98.9 °F (37.2 °C), resp. rate 18, height 5' 8\" (1.727 m), weight 136 lb (61.7 kg), SpO2 97 %.

## 2018-09-03 NOTE — PROGRESS NOTES
Oncology Consultation Note        Patient: Lindsey Hayden MRN: [id number]  SSN: [id number]    YOB: 1940  Age: 70 y.o. Sex: female      Subjective:      Lindsey Hayden is a 70 y.o. female who I am seeing in consultation for a new diagnosis of metastatic gall bladder carcinoma. She presented to her PCP with jaundice for about a week and had been experiencing dark colored urine for about 2 months now. Then she started noticing gurjit colored stool. The appetite went down and she experienced some nausea. She has lost over 20 lbs. She was referred to GI. A CT shows porcelain gall bladder. The mass is invading the liver. She underwent an ERCP, stenting and brushing of the bile duct. The pathology did not establish a confirmed Dx of gallbladder ca. She was referred to Dr. Eamon Kwok for consideration of surgical resection. The disease is felt to be unresectable. She has thus been referred to me for consideration of systemic chemotherapy. She is accompanied by her . 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 They has two children and four grandchildren.        Review of Systems:    Constitutional: negative  Eyes: negative  Ears, Nose, Mouth, Throat, and Face: negative  Respiratory: negative  Cardiovascular: negative  Gastrointestinal: negative  Genitourinary:negative  Integument/Breast: negative  Hematologic/Lymphatic: negative  Musculoskeletal:negative  Neurological: negative        Past Medical History:   Diagnosis Date    Diabetes ([address]     Hypertension     Ill-defined condition     increased cholesterol    Other ill-defined conditions(799.89)     lipid    Personal history of colonic polyps 8/29/2016    Tubular adenoma 2011     Past Surgical History:   Procedure Laterality Date    COLONOSCOPY N/A 8/29/2016    COLON[facility] ENDOSCOPY    HX BACK SURGERY      HX BLANCHE AND BSO      HX TONSILLECTOMY      IA COLSC FLX W/REMOVAL LESION BY HOT BX BELLA  6/9/2011           Family History   Problem Relation Age of Onset    Heart Disease Mother     Cancer Father      metastatic cancer - prostate     Social History   Substance Use Topics    Smoking status: Former Smoker     Packs/day: 0.25     Years: 15.00     Quit date: 8/25/1986    Smokeless tobacco: Never Used    Alcohol use No      Prior to Admission medications    Medication Sig Start Date End Date Taking? Authorizing Provider   ondansetron (ZOFRAN ODT) 4 mg disintegrating tablet Take 1 Tab by mouth every eight (8) hours as needed for Nausea. 8/30/18  Yes Melody Reagan NP   prochlorperazine (COMPAZINE) 10 mg tablet Take 1 Tab by mouth every six (6) hours as needed for up to 30 doses. 8/30/18  Yes Melody Reagan NP   lidocaine-prilocaine (EMLA) topical cream Apply  to affected area as needed for Pain. 8/30/18  Yes Melody Reagan NP   metFORMIN (GLUCOPHAGE) 500 mg tablet Take 500 mg by mouth two (2) times a day. Yes Historical Provider   glipiZIDE (GLUCOTROL) 10 mg tablet Take 10 mg by mouth two (2) times a day. Yes Historical Provider   carvedilol (COREG) 25 mg tablet Take 25 mg by mouth two (2) times a day. Yes Historical Provider   OTHER Nasacort allergies 24 hr. Uses 2 sprays both nostrils as needed. Yes Historical Provider   fexofenadine HCl (ALLEGRA PO) Take  by mouth. 24 hour. Takes one po daily as needed. Yes Historical Provider   linagliptin (TRADJENTA) 5 mg tablet Take 5 mg by mouth daily. Yes Historical Provider   simvastatin (ZOCOR) 20 mg tablet Take 20 mg by mouth nightly. Yes Historical Provider   aspirin 81 mg tablet Take 81 mg by mouth daily.  6/8/11  Yes Historical Provider              Allergies   Allergen Reactions    Lisinopril Swelling     Lips and tongue    Sulfa (Sulfonamide Antibiotics) Itching           Objective:     Vitals:    08/30/18 1440   BP: 142/75   Pulse: 80   Resp: 18   Temp: 98.9 °F (37.2 °C)   SpO2: 97%   Weight: 136 lb (61.7 kg)   Height: 5' 8\" (1.727 m)            Physical Exam:    GENERAL: alert, cooperative, no distress, appears stated age  EYE: negative  LYMPHATIC: Cervical, supraclavicular, and axillary nodes normal.   THROAT & NECK: normal and no erythema or exudates noted. LUNG: clear to auscultation bilaterally  HEART: regular rate and rhythm  ABDOMEN: soft, non-tender  EXTREMITIES:  no edema  SKIN: Normal.  NEUROLOGIC: negative        CT Results (most recent):    Results from Hospital Encounter encounter on 08/13/18   CT ABD PELV W CONT   Narrative EXAM: CT ABDOMEN PELVIS WITH CONTRAST  INDICATION: Jaundice with loss of appetite and pruritus of the skin. Unspecified jaundice. COMPARISON: 7/19/2012. CONTRAST: 100 mL of Isovue-370. TECHNIQUE:   Multislice helical CT was performed from the diaphragm to the symphysis pubis  during uneventful rapid bolus intravenous contrast administration. Oral contrast  was not administered. Contiguous 5 mm axial images were reconstructed and lung  and soft tissue windows were generated. Coronal and sagittal reformations were  generated. CT dose reduction was achieved through use of a standardized protocol  tailored for this examination and automatic exposure control for dose  modulation. FINDINGS:  LOWER CHEST: The visualized portions of the lung bases are clear. ABDOMEN:  Liver: The liver is normal in size and contour with no focal abnormality. There  is marked intrahepatic biliary duct dilatation. Gallbladder and bile ducts: There is fine curvilinear calcification of the  gallbladder wall and there is decreased attenuation soft tissue around the  gallbladder extending into the liver. This measures 4.1 x 3.7 x 4.9 cm. There is  a second smaller low-attenuation lesion in the right lobe of the liver which  measures 2.1 x 1.8 x 1.8 cm. There is soft tissue attenuation in the region of  the cystic and common hepatic duct. The distal common bile duct is not dilated  Spleen: No abnormality.   Pancreas: No abnormality. The pancreatic duct is normal.  Adrenal glands: No abnormality. Kidneys: No abnormality. PELVIS:  Reproductive organs: The uterus is absent. Bladder: No abnormality. BOWEL AND MESENTERY: The small bowel is normal.  There is no mesenteric mass or  adenopathy. The appendix is normal.  There are diverticula of the sigmoid colon. PERITONEUM: There is no ascites or free intraperitoneal air. RETROPERITONEUM: The aorta is atherosclerotic and tapers without aneurysm. There  is no retroperitoneal adenopathy or mass. There is no pelvic mass or adenopathy. BONES AND SOFT TISSUES: The bones and soft tissues of the abdominal wall are  within normal limits. Impression IMPRESSION:   1. 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 The  appearance is most consistent with gallbladder carcinoma. There is also a second  small lesion in the right lobe of the liver consistent with metastasis. 2. Atherosclerotic abdominal aorta without aneurysm. 3. Status post hysterectomy. 4. Diverticulosis. Assessment:     1. 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 She is scheduled for liver biopsy. I suspect this patient has metastatic disease. The standard of care for treatment of unresectable/metastatic cholangiocarcinoma is a combination of Cisplatin/Gemcitabine. [place] et. al from [facility] reported results of a randomized clinical trial for the treatment of such disease in the [facility] 2010 ([address] 2010;362:6269-13)    80 patients with locally advanced or metastatic cholangiocarcinoma, gallbladder cancer, or ampullary cancer were randomly assigned to receive either cisplatin (25 mg per square meter of body-surface area) followed by gemcitabine (1000 mg per square meter), each administered on days 1 and 8, every 3 weeks for eight cycles, or gemcitabine alone (1000 mg per square meter on days 1, 8, and 15, every 4 weeks for six cycles) for up to 24 weeks. The primary end point was overall survival.  After a median follow-up of 8.2 months and 327 deaths, the median overall survival was 11.7 months among the 204 patients in the cisplatin-gemcitabine group and 8.1 months among the 206 patients in the gemcitabine group (hazard ratio, 0.64; 95% confidence interval, 0.52 to 0.80; P<0.001). The median progression-free survival was 8.0 months in the cisplatin-gemcitabine group and 5.0 months in the gemcitabine-only group (P<0.001). In addition, the rate of tumor control among patients in the cisplatin-gemcitabine group was significantly increased (81.4% vs. 71.8%, P = 0.049). Adverse events were similar in the two groups, with the exception of more neutropenia in the cisplatin-gemcitabine group; the number of neutropenia-associated infections was similar in the two groups. I spent more than 65 minutes explaining to the patient and his wife seriousness of the diagnosis of metastatic pancreatic cancer and reassuring her that we will take care of the in the best possible way. I also stressed the need to get a Living Will and a Medical Power of [address] [place] [address]. I explained her the stage of the disease, pathophysiology of the disease and the treatment approaches. I answered all her questions. More than 50% of the time was utilized in education, counseling and co-ordination of care. The patient's emotional well being was addressed during this office visit and patient seems to be coping well with the diagnosis and the treatment. Patient will be meeting with navigation services to discuss any financial barriers to care/estimated cost of care.         2. Protein calorie malnutrition    Dietary consult  Protein supplementation       Plan:       > Biopsy of the liver  > Port-a-cath placement  > Start systemic chemotherapy - Cis/Sutton  > CBC and CMP  > F/U at the time of starting therapy.   > Palliative care consultation        Signed By: Paty Larson MD     September 3, 2018           CC. Roseline Mccracken MD  CC. Ashley Gray MD  CC. Calvin Hernández MD  CC.  Phillip Avelar MD

## 2018-09-05 PROBLEM — C23 GALLBLADDER CANCER (HCC): Status: ACTIVE | Noted: 2018-01-01

## 2018-09-05 NOTE — PROGRESS NOTES
[facility] Lab Visit: 
 
0715:  Pt arrived ambulatory and in no distress for labs. Labs drawn peripherally without difficulty. Patient Vitals for the past 12 hrs: 
 Temp Pulse Resp BP SpO2  
09/05/18 1355 98.1 °F (36.7 °C) 85 18 130/63 100 % 1405 Departed [facility] ambulatory and in no distress. Pt aware of appointment for transfusion 09/07/18.

## 2018-09-07 NOTE — PROGRESS NOTES
[facility] Note - Follow up Subjective Hannah Starks returns for scheduled follow up today. She is s/p ERCP with stent placement and brushings by Dr. Melina Salinas on 8/20/18. This showed a hilar bile duct stricture that was stented. She has noted improvement in her jaundice but still has dark urine. She still feels like she has no energy. She denies any fevers or pain complaints. Her appetite remains poor. She is scheduled to see Medical Oncology (Dr. Dayo Parra) later today. The pathology from her ERCP brushings showed atypical cells but were not diagnostic for malignancy. Objective Visit Vitals  /78 (BP 1 Location: Right arm, BP Patient Position: Sitting)  Pulse 79  Temp 98.7 °F (37.1 °C) (Oral)  Resp 18  Ht 5' 8\" (1.727 m)  Wt 136 lb (61.7 kg)  SpO2 97%  BMI 20.68 kg/m2 PE 
GEN - Awake, alert, communicating appropriately. NAD. Mild icterus still present. Pulm - CTAB 
CV - RRR Abd - soft, NT, ND. No palpable masses or organomegaly. Ext - warm, well perfused. Labs None new Assessment Hannah Starks is a 70 y. o.yr old female with what appears to be metastatic gallbladder cancer and obstructive jaundice. Her disease appears unresectable based upon both locally advanced primary tumor as well as likely metastatic disease in the liver. Plan I will set the patient up for a CT guided biopsy of her liver mass to assist in pathologic diagnosis. The patient has an appointment later today with Dr. Dayo Parra of Medical Oncology. I discussed that due to the advanced nature of this disease, the patient is not a candidate for curative intent therapy and should consider palliative chemotherapy.   Her bilirubin continues to improve but she may require exchange of her stent to a metal stent once a tissue diagnosis is obtained as this will likely need to be present permanently but I will defer this to Dr. Ericka Vance and GI.   
 
25 mins of time was spent with the patient of which > 50% of the time involved face-to-face counseling of the patient regarding the proposed treatment plan. Kaycee Nix MD  
8/30/18 CC: MD Dr. Morris Badillo Dr.

## 2018-09-07 NOTE — PROGRESS NOTES
1000 Pt arrived at [facility] via wheelchair and in no acute distress for transfusion of 1 unit PRBCs. Assessment completed. Pt reports gout flare began yesterday. Reports she spoke with Dr. Neha Shea office who advised to see PCP or urgent care. 22 gauge IV established in left AC without difficulty. Signs/symptoms of adverse blood reaction discussed with pt, voiced understanding. Medications received: 
[facility] 650 mg po Benadryl 25 mg po Patient Vitals for the past 12 hrs: 
 Temp Pulse Resp BP SpO2  
09/07/18 1428 97.7 °F (36.5 °C) 81 18 120/43 -  
09/07/18 1315 98 °F (36.7 °C) 66 18 142/64 -  
09/07/18 1215 98.1 °F (36.7 °C) 68 18 116/46 -  
09/07/18 1145 97.8 °F (36.6 °C) 70 18 125/47 -  
09/07/18 1130 98.2 °F (36.8 °C) 71 18 130/49 -  
09/07/18 1110 98.3 °F (36.8 °C) 79 18 125/51 -  
09/07/18 1013 98.2 °F (36.8 °C) 81 18 117/46 100 % 1115:  1st unit PRBCs started and infusing without difficulty, observed x 15 minutes 1325:  1st unit completed without adverse reaction noted, NS flushing line. 1435 Tolerated transfusion  well, no adverse reaction noted. D/C instructions reviewed, copy to pt, voiced understanding. D/Cd from [facility] via wheelchair and in no acute distress accompanied by . Next appt 09/17/18 at [facility].

## 2018-09-07 NOTE — PROGRESS NOTES
Problem: Anemia Care Plan (Adult and Pediatric) Goal: *Labs within defined limits Outcome: Progressing Towards Goal 
Blood transfusion. Discussed purpose, procedure and possibility of reaction. Pt verbalizes understanding.

## 2018-09-07 NOTE — DISCHARGE INSTRUCTIONS
OUTPATIENT INFUSION CENTER    DISCHARGE INSTRUCTIONS FOR:  BLOOD TRANSFUSION    We hope you are feeling better after your blood transfusion. Some mild tenderness or slight bruising at your IV site is normal.  Avoid lifting or heavy use of that extremity for the rest of the day. Drink plenty of fluids, eat a normal diet and get some rest.    There are some important signs that you need to watch for in case you experience a delayed reaction to the blood you have received. Call your physician immediately if you develop any of the following symptoms:    1. Severe headache or backache;    2. Fever above 100 degrees;    3. Chills;    4. Difficulty breathing;    5.  Blood or red color in urine;    6. The feeling of weakness or constant fatigue;    7. Yellowing of the whites of your eyes or skin (jaundice). If your physician is not available, call or go to the nearest emergency room, or dial 911.     Jose L Fortune, Signature: ___________________________ 9/7/2018  Mariana Freeman RN

## 2018-09-12 NOTE — IP AVS SNAPSHOT
[address] [facility] 
[phone] Patient: Kvng Pack MRN: [id number] AU:[id number] About your hospitalization You were admitted on:  September 12, 2018 You last received care in the:  [facility] RAD CT You were discharged on:  September 12, 2018 Why you were hospitalized Your primary diagnosis was:  Not on File Follow-up Information None Your Scheduled Appointments Monday September 17, 2018  9:00 AM EDT INFUSION 180 RI with MAYCOL INFUSION NURSE 4  
[place] ([address]) [address]  
[phone] Go to [facility], [facility], [address] [facility], [address] Wednesday September 19, 2018  9:30 AM EDT  
ESTABLISHED PATIENT with Michael Burnett MD  
[address] Oncology at [facility]) [address] [place] [address] [address] [facility]  
[phone] Monday September 24, 2018 10:00 AM EDT INFUSION 180 RI with MAYCOL INFUSION NURSE 1  
[place] ([address]) [address]  
[phone] Go to [facility], [facility], [address] [facility] hospital, [place], [address] Tuesday September 25, 2018 10:00 AM EDT INFUSION 180 RI with CHAIR 3 MAYCOL Mann ([address]) [address]  
[phone] Go to [facility], [facility], [address] Critical access hospital, [place], [address] Discharge Orders None A check con indicates which time of day the medication should be taken. My Medications ASK your doctor about these medications Instructions Each Dose to Equal  
 Morning Noon Evening Bedtime  ALLEGRA PO  
   
 Your last dose was: Your next dose is: Take  by mouth. 24 hour. Takes one po daily as needed. aspirin 81 mg tablet Your last dose was: Your next dose is: Take 81 mg by mouth daily. 81 mg  
    
   
   
   
  
 carvedilol 25 mg tablet Commonly known as:  Jestine Pellet Your last dose was: Your next dose is: Take 25 mg by mouth two (2) times a day. 25 mg  
    
   
   
   
  
 glipiZIDE 10 mg tablet Commonly known as:  Seldon Mely Your last dose was: Your next dose is: Take 10 mg by mouth two (2) times a day. 10 mg  
    
   
   
   
  
 indomethacin 50 mg capsule Commonly known as:  INDOCIN Your last dose was: Your next dose is: Take 50 mg by mouth three (3) times daily. 50 mg  
    
   
   
   
  
 lidocaine-prilocaine topical cream  
Commonly known as:  EMLA Your last dose was: Your next dose is:    
   
   
 Apply  to affected area as needed for Pain.  
     
   
   
   
  
 metFORMIN 500 mg tablet Commonly known as:  GLUCOPHAGE Your last dose was: Your next dose is: Take 500 mg by mouth two (2) times a day. 500 mg  
    
   
   
   
  
 ondansetron 4 mg disintegrating tablet Commonly known as:  ZOFRAN ODT Your last dose was: Your next dose is: Take 1 Tab by mouth every eight (8) hours as needed for Nausea. 4 mg OTHER Your last dose was: Your next dose is:    
   
   
 Nasacort allergies 24 hr. Uses 2 sprays both nostrils as needed. prochlorperazine 10 mg tablet Commonly known as:  COMPAZINE Your last dose was: Your next dose is: Take 1 Tab by mouth every six (6) hours as needed for up to 30 doses. 10 mg  
    
   
   
   
  
 simvastatin 20 mg tablet Commonly known as:  ZOCOR  
   
 Your last dose was: Your next dose is: Take 20 mg by mouth nightly. 20 mg  
    
   
   
   
  
 TRADJENTA 5 mg tablet Generic drug:  linagliptin Your last dose was: Your next dose is: Take 5 mg by mouth daily. 5 mg Discharge Instructions [facility] Angiography Department Radiologist:  Dr. April Ray Date:   9/12/2018 Portacath Discharge Instructions Watch for signs of infection: 1. Redness,  
2. Fever, chills,  
3. Increased pain, and/or drainage from the site. If this occurs, call your physician at once. Return next week for a [employer] check: Do not register. Proceed to the Radiology Waiting Area and let the  know you are here for a \"PORT site check\". If you have an appointment with a provider or at the infusion center in the upcoming week,  they may check your site and change the dressing for you. Keep your dressing clean and dry. Leave the dressing in place until seen here next week. Continue your previous diet and restart your regularly prescribed medications. You may take Tylenol, as directed on the label, for pain if needed. Avoid ibuprofen (Advil, Motrin) and aspirin as they may cause you to bleed. Because you received sedation, you are not to drive or sign any legal documents for the next 24 hours. Do not lift anything heavier than 5 pounds with the affected arm and avoid pushing and pulling movements for several days. If you have any questions or concerns, please call our radiology department at [phone]. [facility] Special Procedures/Radiology Department Radiologist:  Dr. April Ray Date:  9/12/2018 Liver Biopsy Discharge Instructions You may have an aching pain in the biopsy site tonight.   Take Tylenol, as directed on the label, for pain or discomfort. Avoid ibuprofen (Advil, Motrin) and aspirin for the next 48 hours as these drugs may cause you to bleed. Resume your previous diet and follow the medication reconciliation form. Rest today. Do not drive or sign any legal documents activity for 24 hours because you received sedation medications. Avoid any strenuous activity for 24 hours. Do not lift anything heavier than a small grocery bag (10 pounds) and avoid twisting for the next 5 days. If you experience severe sweating, severe abdominal pain, dizziness or faintness, go to the nearest Emergency Room immediately. Pain under the left collar bone is normal. 
 
Watch for signs of infection at biopsy site:  redness, pain, drainage, fever chills. If this occurs, call you doctor. Contact your physician after 5 business days for test results. If you have any questions or concerns, please call [phone] and ask to speak to the nurse on-call. Introducing [facility]! Dear Erik Perkins: Thank you for requesting a [employer] account. Our records indicate that you already have an active [employer] account. You can access your account anytime at https://The Shared Web. [employer]/The Shared Web Did you know that you can access your hospital and ER discharge instructions at any time in Tag[employer]? You can also review all of your test results from your hospital stay or ER visit. Additional Information If you have questions, please visit the Frequently Asked Questions section of the Tag[employer] website at https://The Shared Web. [employer]/The Shared Web/. Remember, TagArray is NOT to be used for urgent needs. For medical emergencies, dial 911. Now available from your iPhone and Android! Introducing Aron Gresham As a Cherl Grain patient, I wanted to make you aware of our electronic visit tool called Aron Gresham. Mike Herrmann 24/7 allows you to connect within minutes with a medical provider 24 hours a day, seven days a week via a mobile device or tablet or logging into a secure website from your computer. You can access [employer] from anywhere in the [place]. A virtual visit might be right for you when you have a simple condition and feel like you just dont want to get out of bed, or cant get away from work for an appointment, when your [facility] is not available (evenings, weekends or holidays), or when youre out of town and need minor care. Electronic visits cost only $49 and if the [employer] 24/7 provider determines a prescription is needed to treat your condition, one can be electronically transmitted to a nearby pharmacy*. Please take a moment to enroll today if you have not already done so. The enrollment process is free and takes just a few minutes. To enroll, please download the [employer] kim to your tablet or phone, or visit [email or web address] to enroll on your computer. And, as an [address] [place] [address] patient with a [employer] account, the results of your visits will be scanned into your electronic medical record and your primary care provider will be able to view the scanned results. We urge you to continue to see your regular [facility] provider for your ongoing medical care. And while your primary care provider may not be the one available when you seek a Aron Haganfin virtual visit, the peace of mind you get from getting a real diagnosis real time can be priceless. For more information on [employer], view our Frequently Asked Questions (FAQs) at [email or web address]. Sincerely, 
 
Viki Lee MD 
Chief Medical Officer [employer] *:  certain medications cannot be prescribed via [employer] Unresulted Labs-Please follow up with your PCP about these lab tests Order Current Status CT BX LIVER NDL PERC In process Providers Seen During Your Hospitalization Provider Specialty Primary office phone Perla Velazquez, [facility] [phone] Your Primary Care Physician (PCP) Primary Care Physician Office Phone Office Fax [address] [phone] You are allergic to the following Allergen Reactions Lisinopril Swelling Lips and tongue Sulfa (Sulfonamide Antibiotics) Itching Recent Documentation Height Weight Breastfeeding? BMI OB Status Smoking Status 1.702 m 60.8 kg No 20.99 kg/m2 Hysterectomy Former Smoker Emergency Contacts Name Discharge Info Relation Home Work Mobile [employer] CAREGIVER [3] Spouse [3] [phone] Patient Belongings The following personal items are in your possession at time of discharge: 
     Visual Aid: Glasses, With patient Please provide this summary of care documentation to your next provider. Signatures-by signing, you are acknowledging that this After Visit Summary has been reviewed with you and you have received a copy. Patient Signature:  ____________________________________________________________ Date:  ____________________________________________________________  
  
Jil Russo Provider Signature:  ____________________________________________________________ Date:  ____________________________________________________________

## 2018-09-12 NOTE — DISCHARGE INSTRUCTIONS
[facility] (DP/SNF)  Angiography Department      Radiologist:  Dr. Ania Olson     Date:   9/12/2018      Portacath Discharge Instructions      Watch for signs of infection:    1. Redness,   2. Fever, chills,   3. Increased pain, and/or drainage from the site. If this occurs, call your physician at once. Return next week for a [employer] check:       Do not register. Proceed to the Radiology Waiting Area and let the  know you are here for a \"PORT site check\". If you have an appointment with a provider or at the infusion center in the upcoming week,  they may check your site and change the dressing for you. Keep your dressing clean and dry. Leave the dressing in place until seen here next week. Continue your previous diet and restart your regularly prescribed medications. You may take Tylenol, as directed on the label, for pain if needed. Avoid ibuprofen (Advil, Motrin) and aspirin as they may cause you to bleed. Because you received sedation, you are not to drive or sign any legal documents for the next 24 hours. Do not lift anything heavier than 5 pounds with the affected arm and avoid pushing and pulling movements for several days. If you have any questions or concerns, please call our radiology department at [phone]. [facility] (/SNF)  Special Procedures/Radiology Department    Radiologist:  Dr. Ania Olson    Date:  9/12/2018    Liver Biopsy Discharge Instructions    You may have an aching pain in the biopsy site tonight. Take Tylenol, as directed on the label, for pain or discomfort. Avoid ibuprofen (Advil, Motrin) and aspirin for the next 48 hours as these drugs may cause you to bleed. Resume your previous diet and follow the medication reconciliation form. Rest today. Do not drive or sign any legal documents activity for 24 hours because you received sedation medications. Avoid any strenuous activity for 24 hours. Do not lift anything heavier than a small grocery bag (10 pounds) and avoid twisting for the next 5 days. If you experience severe sweating, severe abdominal pain, dizziness or faintness, go to the nearest Emergency Room immediately. Pain under the left collar bone is normal.    Watch for signs of infection at biopsy site:  redness, pain, drainage, fever chills. If this occurs, call you doctor. Contact your physician after 5 business days for test results. If you have any questions or concerns, please call [phone] and ask to speak to the nurse on-call.

## 2018-09-12 NOTE — H&P
Interventional Radiology History and Physical (Outpatient)    9/12/2018    Patient: Noah Fields 70 y.o. 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 Social History Main Topics    Smoking status: Former Smoker     Packs/day: 0.25     Years: 15.00     Quit date: 8/25/1986    Smokeless tobacco: Never Used    Alcohol use No    Drug use: No    Sexual activity: Not on file     Other Topics Concern    Not on file     Social History Narrative       Allergies: Allergies   Allergen Reactions    Lisinopril Swelling     Lips and tongue    Sulfa (Sulfonamide Antibiotics) Itching       Prior to Admission Medications:  Prior to Admission medications    Medication Sig Start Date End Date Taking? Authorizing Provider   indomethacin (INDOCIN) 50 mg capsule Take 50 mg by mouth three (3) times daily. Yes Historical Provider   ondansetron (ZOFRAN ODT) 4 mg disintegrating tablet Take 1 Tab by mouth every eight (8) hours as needed for Nausea. 8/30/18  Yes Chelsie Gonzalez NP   prochlorperazine (COMPAZINE) 10 mg tablet Take 1 Tab by mouth every six (6) hours as needed for up to 30 doses. 8/30/18  Yes Chelsie Gonzalez NP   metFORMIN (GLUCOPHAGE) 500 mg tablet Take 500 mg by mouth two (2) times a day.    Yes Historical Provider   glipiZIDE (GLUCOTROL) 10 mg tablet Take 10 mg by mouth two (2) times a day. Yes Historical Provider   carvedilol (COREG) 25 mg tablet Take 25 mg by mouth two (2) times a day. Yes Historical Provider   OTHER Nasacort allergies 24 hr. Uses 2 sprays both nostrils as needed. Yes Historical Provider   fexofenadine HCl (ALLEGRA PO) Take  by mouth. 24 hour. Takes one po daily as needed. Yes Historical Provider   linagliptin (TRADJENTA) 5 mg tablet Take 5 mg by mouth daily. Yes Historical Provider   simvastatin (ZOCOR) 20 mg tablet Take 20 mg by mouth nightly. Yes Historical Provider   aspirin 81 mg tablet Take 81 mg by mouth daily. 6/8/11  Yes Historical Provider   lidocaine-prilocaine (EMLA) topical cream Apply  to affected area as needed for Pain. 8/30/18   Dione Holliday NP       Physical Exam:    Blood pressure 151/59, pulse 92, temperature 98.7 °F (37.1 °C), resp. rate 18, height 5' 7\" (1.702 m), weight 60.8 kg (134 lb), SpO2 100 %, not currently breastfeeding. General: alert, cooperative, no distress, appears stated age  Heart: rrr  Lungs: clear to auscultation bilaterally  Abdomen: soft  Neuro: grossly intact  Extremities: extremities wnl    Plan of Care/Planned Procedure:  Risks, benefits, and alternatives reviewed with patient and she agrees to proceed with the procedure.      Severiano Marti MD

## 2018-09-12 NOTE — ROUTINE PROCESS
0800  Patient alert and oriented x 4, accompanied by . 1235  Patient alert and oriented x 4, denies pain to procedure sites, dressings to procedure sites dry and intact. Dr. Dmitriy Jones advised patient can be discharged. Patient provided with verbal and written discharge instructions. Patient discharged via wheelchair with  to transport home.

## 2018-09-17 PROBLEM — T45.1X5A ANEMIA DUE TO CHEMOTHERAPY: Status: ACTIVE | Noted: 2018-01-01

## 2018-09-17 PROBLEM — D64.81 ANEMIA DUE TO CHEMOTHERAPY: Status: ACTIVE | Noted: 2018-01-01

## 2018-09-17 NOTE — PROGRESS NOTES
Oncology Progress Note        Patient: Samson Jensen MRN: [id number]  SSN: [id number]    YOB: 1940  Age: 70 y.o. Sex: female        Diagnosis:     1. 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 Starting palliative chemotherpay      Cisplatin + Gemcitabine cycle 1 Day 1    Subjective:      Samson Jensen is a 70 y.o. female who has a diagnosis of metastatic gall bladder carcinoma. She presented to her PCP with jaundice for about a week and had been experiencing dark colored urine for about 2 months. Then she started noticing gurjit colored stool. The appetite went down and she experienced some nausea. She has lost over 20 lbs. She was referred to GI. A CT shows porcelain gall bladder. The mass is invading the liver. She underwent an ERCP, stenting and brushing of the bile duct. The pathology did not establish a confirmed Dx of gallbladder ca. She was referred to Dr. Eboni Dowling for consideration of surgical resection. The disease is felt to be unresectable. She has thus been referred to me for consideration of systemic chemotherapy. Ms. Gonzalez is here today starting palliative chemotherapy. She met with the RD this morning regarding her appetite. She denies any new complaints and continues to have constipation. Denies pain at this time 0/10.       Review of Systems:    Constitutional: negative  Eyes: negative  Ears, Nose, Mouth, Throat, and Face: negative  Respiratory: negative  Cardiovascular: negative  Gastrointestinal: constipation  Genitourinary:negative  Integument/Breast: negative  Hematologic/Lymphatic: negative  Musculoskeletal:negative  Neurological: negative        Past Medical History:   Diagnosis Date    Diabetes ([facility] [address]     Hypertension     Ill-defined condition     increased cholesterol    Other ill-defined conditions(799.89)     lipid    Personal history of colonic polyps 8/29/2016    Tubular adenoma 2011 Past Surgical History:   Procedure Laterality Date    COLONOSCOPY N/A 8/29/2016    COLONOSCOPY performed by Fran Schwartz MD at [facility] ENDOSCOPY    HX BACK SURGERY      HX BLANCHE AND BSO      HX TONSILLECTOMY      DC COLSC FLX W/REMOVAL LESION BY HOT BX FORCEPS  6/9/2011           Family History   Problem Relation Age of Onset    Heart Disease Mother     Cancer Father      metastatic cancer - prostate     Social History   Substance Use Topics    Smoking status: Former Smoker     Packs/day: 0.25     Years: 15.00     Quit date: 8/25/1986    Smokeless tobacco: Never Used    Alcohol use No      Prior to Admission medications    Medication Sig Start Date End Date Taking? Authorizing Provider   indomethacin (INDOCIN) 50 mg capsule Take 50 mg by mouth three (3) times daily. Yes Historical Provider   ondansetron (ZOFRAN ODT) 4 mg disintegrating tablet Take 1 Tab by mouth every eight (8) hours as needed for Nausea. 8/30/18  Yes Yuly Wills NP   prochlorperazine (COMPAZINE) 10 mg tablet Take 1 Tab by mouth every six (6) hours as needed for up to 30 doses. 8/30/18  Yes Yuly Wills NP   lidocaine-prilocaine (EMLA) topical cream Apply  to affected area as needed for Pain. 8/30/18  Yes Yuly Wills NP   metFORMIN (GLUCOPHAGE) 500 mg tablet Take 500 mg by mouth two (2) times a day. Yes Historical Provider   glipiZIDE (GLUCOTROL) 10 mg tablet Take 10 mg by mouth two (2) times a day. Yes Historical Provider   carvedilol (COREG) 25 mg tablet Take 25 mg by mouth two (2) times a day. Yes Historical Provider   OTHER Nasacort allergies 24 hr. Uses 2 sprays both nostrils as needed. Yes Historical Provider   fexofenadine HCl (ALLEGRA PO) Take  by mouth. 24 hour. Takes one po daily as needed. Yes Historical Provider   linagliptin (TRADJENTA) 5 mg tablet Take 5 mg by mouth daily. Yes Historical Provider   simvastatin (ZOCOR) 20 mg tablet Take 20 mg by mouth nightly.    Yes Historical Provider   aspirin 81 mg tablet Take 81 mg by mouth daily. 6/8/11  Yes Historical Provider            Allergies   Allergen Reactions    Lisinopril Swelling     Lips and tongue    Sulfa (Sulfonamide Antibiotics) Itching         Objective:     Vitals:    09/17/18 0942   BP: 160/73   Pulse: 78   Resp: 16   Temp: 99.2 °F (37.3 °C)   SpO2: 96%   Weight: 137 lb 9 oz (62.4 kg)   Height: 5' 7.5\" (1.715 m)          Physical Exam:    GENERAL: alert, cooperative, no distress, appears stated age  EYE: negative  LYMPHATIC: Cervical, supraclavicular, and axillary nodes normal.   THROAT & NECK: normal and no erythema or exudates noted. LUNG: clear to auscultation bilaterally  HEART: regular rate and rhythm  ABDOMEN: soft, non-tender  EXTREMITIES:  no edema  SKIN: Normal.  NEUROLOGIC: negative          Lab Results   Component Value Date/Time    WBC 11.1 (H) 09/17/2018 09:13 AM    HGB 7.1 (L) 09/17/2018 09:13 AM    HCT 21.7 (L) 09/17/2018 09:13 AM    PLATELET 653 01/16/6071 09:13 AM    MCV 91.6 09/17/2018 09:13 AM       Lab Results   Component Value Date/Time    Sodium 137 09/17/2018 09:13 AM    Potassium 3.8 09/17/2018 09:13 AM    Chloride 103 09/17/2018 09:13 AM    CO2 25 09/17/2018 09:13 AM    Anion gap 9 09/17/2018 09:13 AM    Glucose 174 (H) 09/17/2018 09:13 AM    BUN 16 09/17/2018 09:13 AM    Creatinine 0.84 09/17/2018 09:13 AM    BUN/Creatinine ratio 19 09/17/2018 09:13 AM    GFR est AA >60 09/17/2018 09:13 AM    GFR est non-AA >60 09/17/2018 09:13 AM    Calcium 8.1 (L) 09/17/2018 09:13 AM    Bilirubin, total 4.0 (H) 09/17/2018 09:13 AM    AST (SGOT) 94 (H) 09/17/2018 09:13 AM    Alk.  phosphatase 682 (H) 09/17/2018 09:13 AM    Protein, total 6.3 (L) 09/17/2018 09:13 AM    Albumin 1.6 (L) 09/17/2018 09:13 AM    Globulin 4.7 (H) 09/17/2018 09:13 AM    A-G Ratio 0.3 (L) 09/17/2018 09:13 AM    ALT (SGPT) 62 09/17/2018 09:13 AM           CT Results (most recent):    Results from Hospital Encounter encounter on 09/12/18   CT BX LIVER NDL PERC   Narrative EXAM:  CT BX LIVER NDL PERC    INDICATION:   Request biopsy liver mass. CT dose reduction was achieved through use of a standardized protocol tailored  for this examination and automatic exposure control for dose modulation. FINDINGS:  After initial images acquired on the CT scanner it was evident that access to  the suspected metastasis near the dome of the liver was going to likely require  traversing the pleural space and possibly the lung putting the patient at risk  of pneumothorax. Chart was reviewed. Selected to proceed with biopsy of the  primary site around the gallbladder. The procedure including the risk of bleeding and infection was explained to the  patient and verbal and written informed consent was obtained. The patient was  placed into the CT scanner in the supine position, images were obtained and a  site was localized for biopsy of the gallbladder fossa tumor. The skin was  marked and prepped and draped in sterile fashion and anesthetized with 1%  lidocaine. A 20-gauge Domono coaxial biopsy system was utilized. The introducer  needle was advanced to the margin of the mass and through this 6 core biopsy  specimens were obtained and given to the cytotechnologist and  pathologist to  confirm the adequacy of the tissue sample. 2 biopsy guns were required as the  material was relatively soft and then the needle. Touch preparations were made. The needle was removed and a bandage applied. The patient was monitored by the radiology nurse throughout the procedure with  pulse oximetry and EKG monitoring and Versed and fentanyl were administered for  conscious sedation. The patient tolerated the procedure well without  complication and will be recovered in the radiology holding unit and discharged  to home if stable. Impression IMPRESSION: CT guided right renal fossa mass biopsy performed. Pathology  pending. Assessment:     1.  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 Starting Gemcitabine (1000 mg/m2 on days 1, 8) and Cisplatin  (25mg/m2 on day 1), given every 21 days         Cycle 1 Day 1     I educated her about the potential side effects of the treatment and ways to manage it. She vocalized understanding. Blood counts are acceptable. Results reviewed with the patient. Symptom management form reviewed and scanned into the EMR under Media. 2. Protein calorie malnutrition    Consult Lexx Barboza RD  Protein supplementation      3. Anemia    Ferritin and iron panel today  Plan to start Aranesp in the future       Plan:       · Start systemic treatment with Gemcitabine (1000 mg/m2 on days 1, 8) and Cisplatin  (25mg/m2 on day 1), given every 21 days   · Labs prior to each treatment: CBC with diff on day 1 and day 8; BMP, Magnesium, Phosphorus on day 1   · Antiemetic prophylaxis: Ondansetron on day 1 and 8, Dexamethasone and Fosaprepitant on day 1. Dexamethasone at home on days 2-4. · PRN Antiemetics at home: Ondansetron and Prochlorperazine  · Aggressive hydration with each treatment. Additionally encouraged patient to drink plenty of fluids while on therapy  · Obtain iron studied and Ferritin today  · Plan to start Aranesp in the future  · Return in 3 weeks        Signed by: Sukhwinder Borden MD                     September 17, 2018        CC. Tiffanie Fields MD  CC. Brittany Smalls MD  CC. Umm Dodd MD  CC.  Ryne Benson MD

## 2018-09-17 NOTE — DISCHARGE INSTRUCTIONS

## 2018-09-17 NOTE — PROGRESS NOTES
[address] [facility] at [address] [facility], [address] [facility]   W: [phone]  F: [phone]    Medical Nutrition Therapy      Reason for nutrition visit: Support visit with patient to introduce self and discussed role of oncology dietitian in providing supportive nutrition care. Explained that RD is available to be a resource for managing symptoms, minimizing weight changes and maintaining optimal nutrition status during and after cancer treatment. She reports her intake is best first thing in the morning. She reports lack of desire to eat, nothing is appealing. We discussed importance of nutrition and thinking of food as medicine. Eating based on the time/schedule verses hunger cues. Results:   Diagnosis: Gallbladder  Chemotherapy Flowsheet 9/17/2018   Cycle C1   Date 9/17/2018   Drug / Regimen Gemzar/Cisplatin   Pre Hydration given   Pre Meds given     Wt Readings from Last 7 Encounters:   09/17/18 137 lb 9 oz (62.4 kg)   09/17/18 137 lb 9 oz (62.4 kg)   09/12/18 134 lb (60.8 kg)   08/30/18 136 lb (61.7 kg)   08/30/18 136 lb (61.7 kg)   08/20/18 140 lb (63.5 kg)   08/15/18 143 lb (64.9 kg)       Estimated Nutrition Needs:   Calorie Range: 1560-1872kcal/day  Protein Range: 62-72g/day   Fluid Needs: 1900ml      Assessment:   Inadequate oral food or beverage intake related to chemotherapy and/or disease as evidence by patient report. Plan:   ·  Discussed consuming 5-6 small meals instead of 3 large meals. · Increase intake of nutrient-dense foods   · Drink nutrition supplements   · Take advantage of times when feeling good. · Eat meals and snacks in a pleasant atmosphere. · Keep snacks readily available and in eye sight to promote/stimulate appetite. · Enlist the help of family and caregivers to assist with food procurement and    preparation. · Eat an extra bedtime snack.   This will give you extra calories but won't affect your appetite for the next meals. · Think of food as medicine, eat based on time/schedule verses hunger cues. I appreciate the opportunity to participate in Ms. Jadon Brown Velma's care.     Signed By: Suyapa Chaudhary, [address] [place] [address], [address] [place] [address] , [address]     Contact: [phone]

## 2018-09-17 NOTE — PROGRESS NOTES
Pt arrived to [facility] ambulatory in no acute distress at 0855 for Gemzar/Cisplatin C1.  Assessment unremarkable except pt c/o of fatigue. R chest port accessed without issue and positive blood return noted.     Pt to office for an appointment. Spoke with WONG BASILIO [facility] RN made aware that pt's Hgb 7.1 orders received to continue with treatment as ordered and draw Ferritin and Iron Profile today. Orders for patient to receive Aranesp next treatment to be entered. Visit Vitals    /74 (BP 1 Location: Left arm, BP Patient Position: At rest)    Pulse 78    Temp 98.7 °F (37.1 °C)    Resp 16    Ht 5' 7.5\" (1.715 m)    Wt 62.4 kg (137 lb 9 oz)    SpO2 98%    Breastfeeding No    BMI 21.23 kg/m2     Labs: Some labs have not resulted at the time of this note. Please follow up in Connect care  Recent Results (from the past 12 hour(s))   CBC WITH AUTOMATED DIFF    Collection Time: 09/17/18  9:13 AM   Result Value Ref Range    WBC 11.1 (H) 3.6 - 11.0 K/uL    RBC 2.37 (L) 3.80 - 5.20 M/uL    HGB 7.1 (L) 11.5 - 16.0 g/dL    HCT 21.7 (L) 35.0 - 47.0 %    MCV 91.6 80.0 - 99.0 FL    MCH 30.0 26.0 - 34.0 PG    MCHC 32.7 30.0 - 36.5 g/dL    RDW 15.2 (H) 11.5 - 14.5 %    PLATELET 010 820 - 597 K/uL    MPV 10.2 8.9 - 12.9 FL    NRBC 0.0 0  WBC    ABSOLUTE NRBC 0.00 0.00 - 0.01 K/uL    NEUTROPHILS 80 (H) 32 - 75 %    LYMPHOCYTES 9 (L) 12 - 49 %    MONOCYTES 9 5 - 13 %    EOSINOPHILS 1 0 - 7 %    BASOPHILS 0 0 - 1 %    IMMATURE GRANULOCYTES 1 (H) 0.0 - 0.5 %    ABS. NEUTROPHILS 8.9 (H) 1.8 - 8.0 K/UL    ABS. LYMPHOCYTES 1.0 0.8 - 3.5 K/UL    ABS. MONOCYTES 1.0 0.0 - 1.0 K/UL    ABS. EOSINOPHILS 0.1 0.0 - 0.4 K/UL    ABS. BASOPHILS 0.0 0.0 - 0.1 K/UL    ABS. IMM.  GRANS. 0.1 (H) 0.00 - 0.04 K/UL    DF AUTOMATED     METABOLIC PANEL, COMPREHENSIVE    Collection Time: 09/17/18  9:13 AM   Result Value Ref Range    Sodium 137 136 - 145 mmol/L    Potassium 3.8 3.5 - 5.1 mmol/L    Chloride 103 97 - 108 mmol/L    CO2 25 21 - 32 mmol/L    Anion gap 9 5 - 15 mmol/L    Glucose 174 (H) 65 - 100 mg/dL    BUN 16 6 - 20 MG/DL    Creatinine 0.84 0.55 - 1.02 MG/DL    BUN/Creatinine ratio 19 12 - 20      GFR est AA >60 >60 ml/min/1.73m2    GFR est non-AA >60 >60 ml/min/1.73m2    Calcium 8.1 (L) 8.5 - 10.1 MG/DL    Bilirubin, total 4.0 (H) 0.2 - 1.0 MG/DL    ALT (SGPT) 62 12 - 78 U/L    AST (SGOT) 94 (H) 15 - 37 U/L    Alk. phosphatase 682 (H) 45 - 117 U/L    Protein, total 6.3 (L) 6.4 - 8.2 g/dL    Albumin 1.6 (L) 3.5 - 5.0 g/dL    Globulin 4.7 (H) 2.0 - 4.0 g/dL    A-G Ratio 0.3 (L) 1.1 - 2.2     MAGNESIUM    Collection Time: 09/17/18  9:13 AM   Result Value Ref Range    Magnesium 1.5 (L) 1.6 - 2.4 mg/dL     The following medications administered:  NS KVO  0.9% NS Potassium 10 mEq, magnesium sulfate 2 gram infusion over 1 hour (Pre and Post)  Decadron 12 mg IVP  Emend 150 mg IV  Aloxi 0.25 mg IV  Gemzar 1720 mg IV  Cisplatin 43 mg  IV    Visit Vitals    /74 (BP 1 Location: Left arm, BP Patient Position: At rest;Sitting)    Pulse 77    Temp 98.7 °F (37.1 °C)    Resp 16    Ht 5' 7.5\" (1.715 m)    Wt 62.4 kg (137 lb 9 oz)    SpO2 98%    Breastfeeding No    BMI 21.23 kg/m2         Pt tolerated treatment well.  IV flushed per policy and removed, 2x2 and coban placed.  Pt discharged ambulatory in no acute distress at 1640, accompanied by Spouse.   Next appointment 9/24/18 @ 10 am.

## 2018-09-17 NOTE — PROGRESS NOTES
Deejay Martinez is a 70 y.o. female here today for metastatic gall bladder cancer f/u. Chemo Tx: Cisplatin/Gemcitabine; pt starting on 9/17. Port placed & liver Bx on 9/12/18. VS stable. Patient denies pain. Good appetite. Patient denies N/V/D. Patient c/o constipation; pt denies taking medication for constipation; pt informed she can take OTC stool softener. Patient denies numbness and tingling. Patient denies mouth ulcers. Patient denies cough. Patient denies SOB. Patient c/o fatigue.      Visit Vitals    /73 (BP 1 Location: Left arm, BP Patient Position: Sitting)    Pulse 78    Temp 99.2 °F (37.3 °C) (Oral)    Resp 16    Ht 5' 7.5\" (1.715 m)    Wt 137 lb 9 oz (62.4 kg)    SpO2 96%    BMI 21.23 kg/m2

## 2018-09-24 NOTE — PROGRESS NOTES
Problem: Chemotherapy Treatment  Goal: *Chemotherapy regimen followed  Outcome: Not Progressing Towards Goal  Treatment held this week.  Hgb 6.6

## 2018-09-24 NOTE — PROGRESS NOTES
[facility] - Chemotherapy Progress Note    1000- Pt admit to [facility] for Gemzar/Cisplatin/Injectafer C1D8 ambulatory in stable condition. Assessment completed. R chest port accessed with positive blood return. Labs drawn per order and sent. Line flushed, clamped, Curos Cap applied to end clave. Patient Vitals for the past 12 hrs:   Temp Pulse Resp BP SpO2   09/24/18 1135 - 82 - 133/71 -   09/24/18 1000 98.3 °F (36.8 °C) 86 18 155/76 98 %     Recent Results (from the past 12 hour(s))   CBC WITH AUTOMATED DIFF    Collection Time: 09/24/18  9:59 AM   Result Value Ref Range    WBC 9.0 3.6 - 11.0 K/uL    RBC 2.24 (L) 3.80 - 5.20 M/uL    HGB 6.6 (L) 11.5 - 16.0 g/dL    HCT 19.7 (L) 35.0 - 47.0 %    MCV 87.9 80.0 - 99.0 FL    MCH 29.5 26.0 - 34.0 PG    MCHC 33.5 30.0 - 36.5 g/dL    RDW 14.5 11.5 - 14.5 %    PLATELET 69 (L) 911 - 400 K/uL    MPV 9.9 8.9 - 12.9 FL    NRBC 0.0 0  WBC    ABSOLUTE NRBC 0.00 0.00 - 0.01 K/uL    NEUTROPHILS 78 (H) 32 - 75 %    LYMPHOCYTES 12 12 - 49 %    MONOCYTES 7 5 - 13 %    EOSINOPHILS 1 0 - 7 %    BASOPHILS 0 0 - 1 %    IMMATURE GRANULOCYTES 1 (H) 0.0 - 0.5 %    ABS. NEUTROPHILS 7.1 1.8 - 8.0 K/UL    ABS. LYMPHOCYTES 1.1 0.8 - 3.5 K/UL    ABS. MONOCYTES 0.7 0.0 - 1.0 K/UL    ABS. EOSINOPHILS 0.1 0.0 - 0.4 K/UL    ABS. BASOPHILS 0.0 0.0 - 0.1 K/UL    ABS. IMM.  GRANS. 0.1 (H) 0.00 - 0.04 K/UL    DF AUTOMATED     METABOLIC PANEL, COMPREHENSIVE    Collection Time: 09/24/18  9:59 AM   Result Value Ref Range    Sodium 132 (L) 136 - 145 mmol/L    Potassium 3.6 3.5 - 5.1 mmol/L    Chloride 97 97 - 108 mmol/L    CO2 29 21 - 32 mmol/L    Anion gap 6 5 - 15 mmol/L    Glucose 123 (H) 65 - 100 mg/dL    BUN 13 6 - 20 MG/DL    Creatinine 0.90 0.55 - 1.02 MG/DL    BUN/Creatinine ratio 14 12 - 20      GFR est AA >60 >60 ml/min/1.73m2    GFR est non-AA >60 >60 ml/min/1.73m2    Calcium 7.8 (L) 8.5 - 10.1 MG/DL    Bilirubin, total 4.2 (H) 0.2 - 1.0 MG/DL    ALT (SGPT) 67 12 - 78 U/L AST (SGOT) 69 (H) 15 - 37 U/L    Alk. phosphatase 706 (H) 45 - 117 U/L    Protein, total 6.0 (L) 6.4 - 8.2 g/dL    Albumin 1.8 (L) 3.5 - 5.0 g/dL    Globulin 4.2 (H) 2.0 - 4.0 g/dL    A-G Ratio 0.4 (L) 1.1 - 2.2     MAGNESIUM    Collection Time: 09/24/18  9:59 AM   Result Value Ref Range    Magnesium 1.3 (L) 1.6 - 2.4 mg/dL     Medications:  NS KVO  Injectafer IV  Normal Saline 500 mL with Potassium 10 mEq & Magnesium 2 g IV  Aranesp SQ injection to L arm    Notified MD office of pt's low Hgb. Received orders from LEANN Menard to hold treatment today and resume with C1D8 in one week on 10/1/18. Pt is to receive 1 unit of PRBCs tomorrow. Type and cross drawn and sent to lab for processing. Pt monitored after treatment. Pt tolerated treatment well. No s/s of adverse reaction noted. Port maintained positive blood return throughout treatment, flushed with positive blood return at conclusion, and de-accessed. Discharge instructions reviewed and copies given to pt. Pt verbalized understanding. 1150- D/c home ambulatory in no distress.  Pt aware of next [facility] appointment scheduled for 9/25 at 10 AM.

## 2018-09-24 NOTE — PROGRESS NOTES
1010 Pt arrived at [facility] and in no distress for Blood Transfusion. Assessment completed, no new complaints voiced. Port accessed per protocol with positive blood return. Patient Vitals for the past 12 hrs: 
 Temp Pulse Resp BP SpO2  
09/25/18 1342 98.6 °F (37 °C) 70 18 154/82 -  
09/25/18 1255 98.4 °F (36.9 °C) 72 18 144/73 -  
09/25/18 1155 98.4 °F (36.9 °C) 70 18 149/73 -  
09/25/18 1125 98.7 °F (37.1 °C) 72 18 140/72 -  
09/25/18 1110 99 °F (37.2 °C) 84 18 138/70 -  
09/25/18 1050 99.3 °F (37.4 °C) 81 18 137/70 96 %  
09/25/18 1018 99.1 °F (37.3 °C) 87 18 161/71 97 % Medications received: 
Tylenol Benadryl PRBC's started @ 3679 and completed @ 7047 
 
2900 Pt monitored 55 minutes post transfusion, declined remaining 5 minutes. Tolerated treatment well, no adverse reaction noted. Port flushed and de-accessed. Discharge instructions given. D/Cd from [facility] and in no distress accompanied by spouse. Next appt 10/1.

## 2018-10-01 NOTE — PROGRESS NOTES
Pt arrived to [address] [place] [address]. ambulatory for Cisplatin/Gemzar C1D8 & Injectafer dose 2 in no acute distress at 1010.  Assessment unremarkable except constipation. R chest port accessed without issue and positive blood return noted.  Labs obtained- CBC with diff, CMP, and Magnesium. Visit Vitals    /78 (BP 1 Location: Right arm, BP Patient Position: Supine)    Pulse 90    Temp 98.2 °F (36.8 °C)    Resp 18    Wt 62 kg (136 lb 11.2 oz)    BMI 21.09 kg/m2     Recent Results (from the past 12 hour(s))   CBC WITH AUTOMATED DIFF    Collection Time: 10/01/18 10:13 AM   Result Value Ref Range    WBC 9.5 3.6 - 11.0 K/uL    RBC 2.62 (L) 3.80 - 5.20 M/uL    HGB 8.1 (L) 11.5 - 16.0 g/dL    HCT 24.6 (L) 35.0 - 47.0 %    MCV 93.9 80.0 - 99.0 FL    MCH 30.9 26.0 - 34.0 PG    MCHC 32.9 30.0 - 36.5 g/dL    RDW 18.3 (H) 11.5 - 14.5 %    PLATELET 108 727 - 243 K/uL    MPV 11.0 8.9 - 12.9 FL    NRBC 0.2 (H) 0  WBC    ABSOLUTE NRBC 0.02 (H) 0.00 - 0.01 K/uL    NEUTROPHILS 72 32 - 75 %    LYMPHOCYTES 10 (L) 12 - 49 %    MONOCYTES 13 5 - 13 %    EOSINOPHILS 1 0 - 7 %    BASOPHILS 0 0 - 1 %    IMMATURE GRANULOCYTES 4 (H) 0.0 - 0.5 %    ABS. NEUTROPHILS 6.8 1.8 - 8.0 K/UL    ABS. LYMPHOCYTES 1.0 0.8 - 3.5 K/UL    ABS. MONOCYTES 1.2 (H) 0.0 - 1.0 K/UL    ABS. EOSINOPHILS 0.1 0.0 - 0.4 K/UL    ABS. BASOPHILS 0.0 0.0 - 0.1 K/UL    ABS. IMM.  GRANS. 0.4 (H) 0.00 - 0.04 K/UL    DF AUTOMATED      RBC COMMENTS POLYCHROMASIA  1+        RBC COMMENTS ANISOCYTOSIS  1+       METABOLIC PANEL, COMPREHENSIVE    Collection Time: 10/01/18 10:13 AM   Result Value Ref Range    Sodium 131 (L) 136 - 145 mmol/L    Potassium 3.1 (L) 3.5 - 5.1 mmol/L    Chloride 96 (L) 97 - 108 mmol/L    CO2 27 21 - 32 mmol/L    Anion gap 8 5 - 15 mmol/L    Glucose 180 (H) 65 - 100 mg/dL    BUN 11 6 - 20 MG/DL    Creatinine 0.79 0.55 - 1.02 MG/DL    BUN/Creatinine ratio 14 12 - 20      GFR est AA >60 >60 ml/min/1.73m2    GFR est non-AA >60 >60 ml/min/1.73m2 Calcium 7.1 (L) 8.5 - 10.1 MG/DL    Bilirubin, total 4.4 (H) 0.2 - 1.0 MG/DL    ALT (SGPT) 43 12 - 78 U/L    AST (SGOT) 65 (H) 15 - 37 U/L    Alk. phosphatase 692 (H) 45 - 117 U/L    Protein, total 5.6 (L) 6.4 - 8.2 g/dL    Albumin 1.6 (L) 3.5 - 5.0 g/dL    Globulin 4.0 2.0 - 4.0 g/dL    A-G Ratio 0.4 (L) 1.1 - 2.2     MAGNESIUM    Collection Time: 10/01/18 10:13 AM   Result Value Ref Range    Magnesium 1.4 (L) 1.6 - 2.4 mg/dL       The following medications administered:  NS @ KVO  Injectafer 750 mg IV over 15 minutes   mL + KCL 10 mEq and Magnesium Sulfate 2 grams IV over 1 hour  Decadron 12 mg IV over 10 minutes  Emend 150 mg IV over 20 minutes  Aloxi 0.25 mg IVP  Gemzar 1,376 mg IV over 30 minutes  Cisplatin 34.4 mg IV over 2 hours   mL + KCL 10 mEq and Magnesium Sulfate 2 grams IV over 2 hours (run concurrently with Cisplatin)    Visit Vitals    /76 (BP 1 Location: Right arm, BP Patient Position: Sitting)    Pulse 85    Temp 98.2 °F (36.8 °C)    Resp 18    Ht 5' 7.5\" (1.715 m)    Wt 62 kg (136 lb 11.2 oz)    BMI 21.09 kg/m2       Pt tolerated treatment well.  No adverse reaction noted. Port flushed per policy and removed, 2x2 and paper tape placed.  Pt discharged ambulatory in no acute distress at 1640, accompanied by spouse. Next appointment 10/8/18 @ 1000.

## 2018-10-15 NOTE — PROGRESS NOTES
Problem: Chemotherapy Treatment  Goal: *Chemotherapy regimen followed  Outcome: Not Progressing Towards Goal  C2D1 held today, resume in 2 weeks.

## 2018-10-15 NOTE — PROGRESS NOTES
Oncology Progress Note        Patient: Urbano Lauren MRN: [id number]  SSN: [id number]    YOB: 1940  Age: 70 y.o. Sex: female        Diagnosis:     1. 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 Palliative chemotherpay       Cisplatin + Gemcitabine - s/p 1 cycle    Subjective:      Urbano Lauren is a 70 y.o. female who has a diagnosis of metastatic gall bladder carcinoma. She presented to her PCP with jaundice for about a week and had been experiencing dark colored urine for about 2 months. Then she started noticing gurjit colored stool. The appetite went down and she experienced some nausea. She has lost over 20 lbs. She was referred to GI. A CT shows porcelain gall bladder. The mass is invading the liver. She underwent an ERCP, stenting and brushing of the bile duct. The pathology did not establish a confirmed Dx of gallbladder ca. She was referred to Dr. Lucas Paniagua for consideration of surgical resection. The disease is felt to be unresectable. Ms. Joaquin Fierro is here today with her  after 1 cycle of palliative chemotherapy. She had a difficult time with treatment. She reports poor appetite, taste changes, and fatigue. She is extremely weak and is using a wheelchair.       Review of Systems:    Constitutional: fatigue, weakness, taste alterations  Eyes: negative  Ears, Nose, Mouth, Throat, and Face: negative  Respiratory: negative  Cardiovascular: negative  Gastrointestinal: negative  Genitourinary:negative  Integument/Breast: negative  Hematologic/Lymphatic: negative  Musculoskeletal:negative  Neurological: negative        Past Medical History:   Diagnosis Date    Diabetes ([facility] [address]     Hypertension     Ill-defined condition     increased cholesterol    Other ill-defined conditions(799.89)     lipid    Personal history of colonic polyps 8/29/2016    Tubular adenoma 2011     Past Surgical History:   Procedure Laterality Date    COLONOSCOPY N/A 8/29/2016    COLONOSCOPY performed by Stefanie Sloan MD at [facility] ENDOSCOPY    HX BACK SURGERY      HX BLANCHE AND BSO      HX TONSILLECTOMY      MD COLSC FLX W/REMOVAL LESION BY HOT BX FORCEPS  6/9/2011           Family History   Problem Relation Age of Onset    Heart Disease Mother     Cancer Father      metastatic cancer - prostate     Social History   Substance Use Topics    Smoking status: Former Smoker     Packs/day: 0.25     Years: 15.00     Quit date: 8/25/1986    Smokeless tobacco: Never Used    Alcohol use No      Prior to Admission medications    Medication Sig Start Date End Date Taking? Authorizing Provider   indomethacin (INDOCIN) 50 mg capsule Take 50 mg by mouth three (3) times daily. Yes Historical Provider   lidocaine-prilocaine (EMLA) topical cream Apply  to affected area as needed for Pain. 8/30/18  Yes Jeremy Tovar NP   metFORMIN (GLUCOPHAGE) 500 mg tablet Take 500 mg by mouth two (2) times a day. Yes Historical Provider   glipiZIDE (GLUCOTROL) 10 mg tablet Take 10 mg by mouth two (2) times a day. Yes Historical Provider   carvedilol (COREG) 25 mg tablet Take 25 mg by mouth two (2) times a day. Yes Historical Provider   OTHER Nasacort allergies 24 hr. Uses 2 sprays both nostrils as needed. Yes Historical Provider   linagliptin (TRADJENTA) 5 mg tablet Take 5 mg by mouth daily. Yes Historical Provider   simvastatin (ZOCOR) 20 mg tablet Take 20 mg by mouth nightly. Yes Historical Provider   ondansetron (ZOFRAN ODT) 4 mg disintegrating tablet Take 1 Tab by mouth every eight (8) hours as needed for Nausea. 8/30/18   Jeremy Tovar NP   prochlorperazine (COMPAZINE) 10 mg tablet Take 1 Tab by mouth every six (6) hours as needed for up to 30 doses. 8/30/18   Jeremy Tovar NP   fexofenadine HCl (ALLEGRA PO) Take  by mouth. 24 hour. Takes one po daily as needed. Historical Provider   aspirin 81 mg tablet Take 81 mg by mouth daily.  6/8/11   Historical Provider Allergies   Allergen Reactions    Lisinopril Swelling     Lips and tongue    Sulfa (Sulfonamide Antibiotics) Itching         Objective:     Vitals:    10/15/18 1101   BP: 154/86   Pulse: (!) 123   Resp: 18   Temp: 98 °F (36.7 °C)   SpO2: 91%   Weight: 133 lb (60.3 kg)   Height: 5' 7\" (1.702 m)          Physical Exam:    GENERAL: alert, cooperative, weak, cachectic  EYE: jaundice  LYMPHATIC: Cervical, supraclavicular, and axillary nodes normal.   THROAT & NECK: normal and no erythema or exudates noted. LUNG: clear to auscultation bilaterally  HEART: regular rate and rhythm  ABDOMEN: soft, non-tender  EXTREMITIES:  no edema  SKIN: Normal.  NEUROLOGIC: negative          Lab Results   Component Value Date/Time    WBC 12.4 (H) 10/15/2018 10:17 AM    HGB 7.0 (L) 10/15/2018 10:17 AM    HCT 21.5 (L) 10/15/2018 10:17 AM    PLATELET 298 (L) 54/44/4242 10:17 AM    MCV 90.7 10/15/2018 10:17 AM       Lab Results   Component Value Date/Time    Sodium 133 (L) 10/15/2018 10:17 AM    Potassium 3.1 (L) 10/15/2018 10:17 AM    Chloride 96 (L) 10/15/2018 10:17 AM    CO2 28 10/15/2018 10:17 AM    Anion gap 9 10/15/2018 10:17 AM    Glucose 320 (H) 10/15/2018 10:17 AM    BUN 33 (H) 10/15/2018 10:17 AM    Creatinine 1.10 (H) 10/15/2018 10:17 AM    BUN/Creatinine ratio 30 (H) 10/15/2018 10:17 AM    GFR est AA 59 (L) 10/15/2018 10:17 AM    GFR est non-AA 48 (L) 10/15/2018 10:17 AM    Calcium 7.9 (L) 10/15/2018 10:17 AM    Bilirubin, total 7.7 (H) 10/15/2018 10:17 AM    AST (SGOT) 54 (H) 10/15/2018 10:17 AM    Alk.  phosphatase 520 (H) 10/15/2018 10:17 AM    Protein, total 5.6 (L) 10/15/2018 10:17 AM    Albumin 1.3 (L) 10/15/2018 10:17 AM    Globulin 4.3 (H) 10/15/2018 10:17 AM    A-G Ratio 0.3 (L) 10/15/2018 10:17 AM    ALT (SGPT) 42 10/15/2018 10:17 AM           CT Results (most recent):    Results from [place] encounter on 09/12/18   CT BX LIVER NDL PERC   Narrative EXAM:  CT BX LIVER NDL PERC    INDICATION:   Request biopsy liver mass. CT dose reduction was achieved through use of a standardized protocol tailored  for this examination and automatic exposure control for dose modulation. FINDINGS:  After initial images acquired on the CT scanner it was evident that access to  the suspected metastasis near the dome of the liver was going to likely require  traversing the pleural space and possibly the lung putting the patient at risk  of pneumothorax. Chart was reviewed. Selected to proceed with biopsy of the  primary site around the gallbladder. The procedure including the risk of bleeding and infection was explained to the  patient and verbal and written informed consent was obtained. The patient was  placed into the CT scanner in the supine position, images were obtained and a  site was localized for biopsy of the gallbladder fossa tumor. The skin was  marked and prepped and draped in sterile fashion and anesthetized with 1%  lidocaine. A 20-gauge [employer] coaxial biopsy system was utilized. The introducer  needle was advanced to the margin of the mass and through this 6 core biopsy  specimens were obtained and given to the cytotechnologist and  pathologist to  confirm the adequacy of the tissue sample. 2 biopsy guns were required as the  material was relatively soft and then the needle. Touch preparations were made. The needle was removed and a bandage applied. The patient was monitored by the radiology nurse throughout the procedure with  pulse oximetry and EKG monitoring and Versed and fentanyl were administered for  conscious sedation. The patient tolerated the procedure well without  complication and will be recovered in the radiology holding unit and discharged  to home if stable. Impression IMPRESSION: CT guided right renal fossa mass biopsy performed. Pathology  pending. Assessment:     1.  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 Receiving palliative chemotherapy   Gemcitabine (1000 mg/m2 on days 1, 8) and Cisplatin  (25mg/m2 on day 1), given every 21 days - s/p 1 Cycle    Feeling poorly  + fatigue, taste alterations, anemia, weight loss  A detailed system by system evaluation of side effect was performed to assess chemotherapy related toxicity. Blood counts are low. Results reviewed with the patient. Symptom management form reviewed and scanned into the EMR under Media. Hold chemotherapy. 2. Protein calorie malnutrition    Consult Corina Lombardo RD  Protein supplementation      3. Anemia    Transfuse 1 unit PRBCs tomorrow  Start Aranesp        4. Hyperbilirubinemia    > Follow-up with Dr. Jennifer Baires       5. Hypokalemia, hypomagnesia     > Potassium 20 mEq IV today in [facility]  > Magnesium 3 g IV today in [facility]  > Potassium 10 mEq po daily - prescription sent to pharmacy      6. Dehydration    > Bolus 1 liter NS in [facility] today      Plan:       · Hold chemotherapy for 2 weeks  · Transfuse 1 unit PRBCs tomorrow  · Aranesp today  · Fluids in [facility] today  · IV potassium and magnesium today in [facility]  · Oral potassium - prescription sent to pharmacy  · Follow-up with Dr. Jeninfer Baires  · MRI/MRCP   · Return in 2 weeks        Signed by: Buck Arechiga MD                     October 16, 2018        CC. Liliam Mari MD  CC. Elvira Bazan MD  CC. Olivia Kendrick MD  CC.  Ferny Montilla MD

## 2018-10-15 NOTE — PROGRESS NOTES
Pt is a 70 yr old pt here for Cycle 2 of [place]/Cisplatin. Pt feeling poorly, not eating, only drinking Boost/Ensure, pale/jaundice skin color. Pt reports feeling weak and unsteady. hgb today 7.0. Pt denies nausea but does have some pain in her left side, none now. Blood pressure 154/86, pulse (!) 123, temperature 98 °F (36.7 °C), resp. rate 18, height 5' 7\" (1.702 m), weight 133 lb (60.3 kg), SpO2 91 %. Pt does have 2+ bilat ankle, foot, and leg edema, she reports this is not new.

## 2018-10-15 NOTE — PROGRESS NOTES
[facility]fusion [facility] - Chemotherapy Progress Note    1010- Pt admit to [facility] for C2D1 ambulatory in stable condition. Assessment completed. Pt presents with unsteady gait, generalized weakness & fatigue, elevated HR, constipation, poor appetite, and swelling to BLE. Right chest port accessed with positive blood return. Labs drawn per order and sent. Line flushed, clamped, Curos Cap applied to end clave. Pt wheeled over to MD office accompanied by PCT and spouse. Recent Results (from the past 12 hour(s))   CBC WITH AUTOMATED DIFF    Collection Time: 10/15/18 10:17 AM   Result Value Ref Range    WBC 12.4 (H) 3.6 - 11.0 K/uL    RBC 2.37 (L) 3.80 - 5.20 M/uL    HGB 7.0 (L) 11.5 - 16.0 g/dL    HCT 21.5 (L) 35.0 - 47.0 %    MCV 90.7 80.0 - 99.0 FL    MCH 29.5 26.0 - 34.0 PG    MCHC 32.6 30.0 - 36.5 g/dL    RDW 19.0 (H) 11.5 - 14.5 %    PLATELET 779 (L) 368 - 400 K/uL    NRBC 0.2 (H) 0  WBC    ABSOLUTE NRBC 0.02 (H) 0.00 - 0.01 K/uL    NEUTROPHILS 90 (H) 32 - 75 %    BAND NEUTROPHILS 1 %    LYMPHOCYTES 8 (L) 12 - 49 %    MONOCYTES 1 (L) 5 - 13 %    EOSINOPHILS 0 0 - 7 %    BASOPHILS 0 0 - 1 %    IMMATURE GRANULOCYTES 0 0.0 - 0.5 %    ABS. NEUTROPHILS 11.3 (H) 1.8 - 8.0 K/UL    ABS. LYMPHOCYTES 1.0 0.8 - 3.5 K/UL    ABS. MONOCYTES 0.1 0.0 - 1.0 K/UL    ABS. EOSINOPHILS 0.0 0.0 - 0.4 K/UL    ABS. BASOPHILS 0.0 0.0 - 0.1 K/UL    ABS. IMM.  GRANS. 0.0 0.00 - 0.04 K/UL    DF AUTOMATED      RBC COMMENTS ANISOCYTOSIS  1+        RBC COMMENTS POLYCHROMASIA  1+        WBC COMMENTS TOXIC GRANULATION     METABOLIC PANEL, COMPREHENSIVE    Collection Time: 10/15/18 10:17 AM   Result Value Ref Range    Sodium 133 (L) 136 - 145 mmol/L    Potassium 3.1 (L) 3.5 - 5.1 mmol/L    Chloride 96 (L) 97 - 108 mmol/L    CO2 28 21 - 32 mmol/L    Anion gap 9 5 - 15 mmol/L    Glucose 320 (H) 65 - 100 mg/dL    BUN 33 (H) 6 - 20 MG/DL    Creatinine 1.10 (H) 0.55 - 1.02 MG/DL    BUN/Creatinine ratio 30 (H) 12 - 20      GFR est AA 59 (L) >60 ml/min/1.73m2    GFR est non-AA 48 (L) >60 ml/min/1.73m2    Calcium 7.9 (L) 8.5 - 10.1 MG/DL    Bilirubin, total 7.7 (H) 0.2 - 1.0 MG/DL    ALT (SGPT) 42 12 - 78 U/L    AST (SGOT) 54 (H) 15 - 37 U/L    Alk. phosphatase 520 (H) 45 - 117 U/L    Protein, total 5.6 (L) 6.4 - 8.2 g/dL    Albumin 1.3 (L) 3.5 - 5.0 g/dL    Globulin 4.3 (H) 2.0 - 4.0 g/dL    A-G Ratio 0.3 (L) 1.1 - 2.2     MAGNESIUM    Collection Time: 10/15/18 10:17 AM   Result Value Ref Range    Magnesium 1.5 (L) 1.6 - 2.4 mg/dL     Patient Vitals for the past 12 hrs:   Temp Pulse Resp BP SpO2   10/15/18 1414 - (!) 103 - 144/74 -   10/15/18 1013 95.9 °F (35.5 °C) (!) 125 16 153/84 96 %     Orders received from LEANN Menard at MD office to hold treatment today and resume in 2 weeks. Pt is to receive 1 unit PRBCs tomorrow. Type and cross drawn and sent to lab for processing. Medications:  Normal Saline 500 mL with Potassium 10 mEq and Magnesium 2 g IV  Normal Saline 500 mL with Potassium 10 mEq and Magnesium 1 g IV  Potassium 40 mEq PO  Aranesp SQ injection to left arm    1425- Pt tolerated treatment well. Port maintained positive blood return throughout treatment, flushed with positive blood return at conclusion, and de-accessed. D/c home in wheelchair in no distress accompanied by spouse. Pt aware of next [facility] appointment scheduled for 10/16 at 10 AM at [facility].

## 2018-10-16 NOTE — PROGRESS NOTES
PEDI [facility] VISIT NOTE 
 
8892: Patient arrives for blood transfusion without acute problems. Please see connect care for complete assessment and education provided. All central lines follow the Pediatric [facility]. Vital signs stable throughout and prior to discharge, Pt. Tolerated treatment well and discharged without incident. Medications Verified by Zoran Whitney, RN and Kaitlyn Mauro RN via Micromedex: 
1. 1 Unit PRBCs VITAL SIGNS Patient Vitals for the past 12 hrs: 
 Temp Pulse Resp BP SpO2  
10/16/18 1400 97.9 °F (36.6 °C) 99 16 146/78 -  
10/16/18 1300 97.7 °F (36.5 °C) 98 16 137/83 -  
10/16/18 1200 97.4 °F (36.3 °C) 98 16 140/69 -  
10/16/18 1130 97.7 °F (36.5 °C) (!) 101 16 148/80 -  
10/16/18 1115 98 °F (36.7 °C) (!) 105 16 138/77 -  
10/16/18 1100 97.6 °F (36.4 °C) (!) 101 16 128/67 98 % 10/16/18 0959 97.8 °F (36.6 °C) (!) 111 16 144/78 -

## 2018-10-17 NOTE — DISCHARGE INSTRUCTIONS
[address]  [address] [facility] [address]  [id number]  1940    DISCOMFORT:  Sore throat- throat lozenges or warm salt water gargle  redness at IV site- apply warm compress to area; if redness or soreness persist- contact your physician  Gaseous discomfort- walking, belching will help relieve any discomfort  You may not operate a vehicle for 12 hours  You may not engage in an occupation involving machinery or appliances for rest of today  You may not drink alcoholic beverages for at least 12 hours  Avoid making any critical decisions for at least 24 hour  DIET  You may eat and drink after you leave. You may resume your regular diet - however -  remember your colon is empty and a heavy meal will produce gas. Avoid these foods:  vegetables, fried / greasy foods, carbonated drinks    ACTIVITY  You may resume your normal daily activities   Spend the remainder of the day resting -  avoid any strenuous activity. CALL M.D. ANY SIGN OF   Increasing pain, nausea, vomiting  Abdominal distension (swelling)  New increased bleeding (oral or rectal)  Fever (chills)  Pain in chest area  Bloody discharge from nose or mouth  Shortness of breath    Follow-up Instructions:   Call Dr. Alonso Wilson for any questions or problems. If we took a biopsy please call the office within 2 weeks to discuss your                             pathology results. Telephone # [phone]        Continue same medications. ENDOSCOPY FINDINGS:   Your endoscopy showed bile duct stricture and cholangitis.

## 2018-10-17 NOTE — TELEPHONE ENCOUNTER
called to say patient just finished getting a stint at [facility]. He is questioning the size of the tumor and interested in possibly having an MRI done to see where they are at now. If calling this afternoon, call [phone] while she is in recovery. If after that, call [phone].

## 2018-10-17 NOTE — PROGRESS NOTES

## 2018-10-17 NOTE — ANESTHESIA POSTPROCEDURE EVALUATION
Procedure(s): ENDOSCOPIC RETROGRADE CHOLANGIOPANCREATOGRAPHY (ERCP) ENDOSCOPY WITH PROSTHESIS OR STENT REMOVAL 
BILIARY STENT PLACEMENT. Anesthesia Post Evaluation Patient location during evaluation: PACU Level of consciousness: awake and alert Pain management: satisfactory to patient Airway patency: patent Anesthetic complications: no 
Cardiovascular status: acceptable Respiratory status: acceptable Hydration status: acceptable Visit Vitals BP (!) 187/106 Pulse (!) 123 Temp 36.4 °C (97.6 °F) Resp 21 Ht 5' 7\" (1.702 m) Wt 60.3 kg (133 lb) SpO2 (!) 89% BMI 20.83 kg/m²

## 2018-10-17 NOTE — PROCEDURES
[address] 
[address] [facility] [address] [facility], [address] 
[phone] ERCP NOTE 
 
NAME:  Deric Garza :   1940 MRN:   [id number] Date/Time:  10/17/2018 3:54 PM 
 
Procedure Type:   ERCPwith biliary stent change Indications: jaundice, biliary obstruction Pre-operative Diagnosis: see indication above Post-operative Diagnosis:  See findings below : Sandrine Busch MD 
 
Referring Provider:     Tyson Perales MD 
 
Sedation:  General anesthesia Procedure Details:  After informed consent was obtained with all risks and benefits of procedure explained, the patient was taken to the fluoroscopy suite and placed in the prone position. Upon sequential sedation as per above, the Olympus duodenoscope [id number]   was inserted via the mouthpeice and carefully advanced to the second portion of the duodenum. The quality of visualization was good. The duodenoscope was withdrawn into a short position. Findings:  
Esophagus: not examined in detail Stomach: not examined in detail Duodenum/jejunum: normal 
Ampulla: protruding stent and previous sphincterotomy Cholangiogram: One plastic biliary stent was removed using a snare. Bile, pus and blood cots came out of the ampulla. Bile duct was then selectively cannulated using Dreamwire 0.035 in. Contrast was injected. A stricture was noted in the common hepatic duct about 1 cm distal to the biliary confluence. An 8 mm X 8 cm fully covered biliary metallic stent [employer]) was placed successfully. Stent was in good position and bile flowed through it. Specimen Removed: * No specimens in log * Complications: None. EBL:  None. Interventions:   
Pancreatic: none Biliary: One plastic biliary stent was removed using a snare. Bile duct was then selectively cannulated using Dreamwire 0.035 in. Contrast was injected.  A stricture was noted in the common hepatic duct about 1 cm distal to the biliary confluence. An 8 mm X 8 cm fully covered biliary metallic stent [employer]) was placed successfully. Stent was in good position and bile flowed through it. Impression:  -Bile duct stricture and cholangitis-stent changed Recommendations:   
Clear liquid diet and advance as tolerated. Resume normal medication(s). Begin Levaquin 500 mg 1 PO daily X 7 days ERCP in 4-6 months based Discharge Disposition:  Home following recovery in Endoscopy Ludwin Pelayo MD 
10/17/2018  3:54 PM

## 2018-10-18 NOTE — TELEPHONE ENCOUNTER
Called and spoke with patient's . He wanted to know if an MRI could be done. It was previously ordered and he told the  it was not needed. Gave him the phone number to call scheduling and get the scan ordered. He will call back if there are any problems.

## 2018-10-22 NOTE — PROGRESS NOTES
[facility]  Social Work Navigator Encounter     Patient Name:  Joe Hickman    Medical History: dx metastatic gall bladder cancer w/liver mets    Advance Directives:  Not on file    Narrative: Treatment has not slowed down the cancer, cancer continues to spread. Oncologist recommended hospice as surgery and radiation are not options as disease is not local.       Pt spouse and dtr(from [place]) present and will call SW this afternoon about hospice. Pt is uncomfortable and we discussed hospital bed might help. Pt has 2 adult children, James Araiza who has two 23 and 12 and son, Dagoberto Ayon has two 11 and 8. The grandchildren have not been told. Sw reviewed the concept of hospice. Per  dtr pt has long term care insurance, SW advised to call and get paper work started and there is a waiting period for many. 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 Barriers to Care:     Plan:   1.  SW to f/u with hospice order when contacted by the family. 2.  Kennedi with [facility] called, pt dtr requesting pt be seen today and they can accommodate. SW to send orders, insurance info, office notes. Fax sent before lunch. 3.  SW spoke with dtr and hospice did come out but pt spouse not ready to sign. 4.  Son Dagoberto Ayon in route home to see Mom.   5.  Dtr to bring her boys from [place] to [place] to see their Mom.

## 2018-10-22 NOTE — PROGRESS NOTES
Nsetor Diaz is a 70 y.o. female here today for metastatic gallbladder cancer f/u. Patient receiving Aranesp Q 21 days. VS stable. Patient reports right side pain. Good appetite. Patient denies N/V/D and constipation. Patient denies numbness and tingling. Patient denies mouth ulcers. Patient denies cough. Patient denies SOB. Patient reports swelling in her right foot.

## 2018-10-22 NOTE — PROGRESS NOTES
Oncology Progress Note        Patient: Alix Rojas MRN: [id number]  SSN: [id number]    YOB: 1940  Age: 70 y.o. Sex: female        Diagnosis:     1. 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 Palliative chemotherpay       Cisplatin + Gemcitabine - s/p 1 cycle    Subjective:      Alix Rojas is a 70 y.o. female who has a diagnosis of metastatic gall bladder carcinoma. She presented to her PCP with jaundice for about a week and had been experiencing dark colored urine for about 2 months. Then she started noticing gurjit colored stool. The appetite went down and she experienced some nausea. She has lost over 20 lbs. She was referred to GI. A CT shows porcelain gall bladder. The mass is invading the liver. She underwent an ERCP, stenting and brushing of the bile duct. The pathology did not establish a confirmed Dx of gallbladder ca. She was referred to Dr. Donavon Kc for consideration of surgical resection. The disease is felt to be unresectable. Ms. Gonzalez is here today with her  after 1 cycle of palliative chemotherapy. She had a difficult time with treatment. She reports poor appetite, taste changes, and fatigue. She is extremely weak and is using a wheelchair. MRI shows rapid progression of disease.        Review of Systems:    Constitutional: fatigue, weakness, taste alterations  Eyes: negative  Ears, Nose, Mouth, Throat, and Face: negative  Respiratory: negative  Cardiovascular: negative  Gastrointestinal: anorexia  Genitourinary:negative  Integument/Breast: negative  Hematologic/Lymphatic: negative  Musculoskeletal:negative  Neurological: negative        Past Medical History:   Diagnosis Date    Diabetes ([address]     Hypertension     Ill-defined condition     increased cholesterol    Other ill-defined conditions(799.89)     lipid    Personal history of colonic polyps 8/29/2016    Tubular adenoma 2011     Past Surgical History:   Procedure Laterality Date    HX BACK SURGERY      HX BLANCHE AND BSO      HX TONSILLECTOMY      DE COLSC FLX W/REMOVAL LESION BY HOT BX FORCEPS  2011           Family History   Problem Relation Age of Onset    Heart Disease Mother     Cancer Father         metastatic cancer - prostate     Social History     Tobacco Use    Smoking status: Former Smoker     Packs/day: 0.25     Years: 15.00     Pack years: 3.75     Last attempt to quit: 1986     Years since quittin.1    Smokeless tobacco: Never Used   Substance Use Topics    Alcohol use: No      Prior to Admission medications    Medication Sig Start Date End Date Taking? Authorizing Provider   potassium chloride (KLOR-CON) 10 mEq tablet Take 1 Tab by mouth daily. 10/15/18  Yes Deedee Trejo NP   indomethacin (INDOCIN) 50 mg capsule Take 50 mg by mouth three (3) times daily. Yes Provider, Historical   ondansetron (ZOFRAN ODT) 4 mg disintegrating tablet Take 1 Tab by mouth every eight (8) hours as needed for Nausea. 18  Yes Maranda Akhtar NP   prochlorperazine (COMPAZINE) 10 mg tablet Take 1 Tab by mouth every six (6) hours as needed for up to 30 doses. 18  Yes Maranda Akhtar NP   lidocaine-prilocaine (EMLA) topical cream Apply  to affected area as needed for Pain. 18  Yes Hanny, Raeanne Snellen H, NP   metFORMIN (GLUCOPHAGE) 500 mg tablet Take 500 mg by mouth two (2) times a day. Yes Provider, Historical   glipiZIDE (GLUCOTROL) 10 mg tablet Take 10 mg by mouth two (2) times a day. Yes Provider, Historical   carvedilol (COREG) 25 mg tablet Take 25 mg by mouth two (2) times a day. Yes Provider, Historical   OTHER Nasacort allergies 24 hr. Uses 2 sprays both nostrils as needed. Yes Provider, Historical   fexofenadine HCl (ALLEGRA PO) Take  by mouth. 24 hour. Takes one po daily as needed. Yes Provider, Historical   linagliptin (TRADJENTA) 5 mg tablet Take 5 mg by mouth daily.    Yes Provider, Historical   simvastatin (ZOCOR) 20 mg tablet Take 20 mg by mouth nightly. Yes Provider, Historical   aspirin 81 mg tablet Take 81 mg by mouth daily. 6/8/11  Yes Provider, Historical            Allergies   Allergen Reactions    Lisinopril Swelling     Lips and tongue    Sulfa (Sulfonamide Antibiotics) Itching         Objective:     Vitals:    10/22/18 1041   BP: 143/76   Pulse: 68   Resp: 18   Temp: 98.6 °F (37 °C)   TempSrc: Oral   Weight: 135 lb (61.2 kg)   Height: 5' 7\" (1.702 m)          Physical Exam:    GENERAL: alert, cooperative, weak, cachectic  EYE: jaundice  LYMPHATIC: Cervical, supraclavicular, and axillary nodes normal.   THROAT & NECK: normal and no erythema or exudates noted. LUNG: clear to auscultation bilaterally  HEART: regular rate and rhythm  ABDOMEN: soft, non-tender  EXTREMITIES:  no edema  SKIN: Normal.  NEUROLOGIC: negative          Lab Results   Component Value Date/Time    WBC 12.4 (H) 10/15/2018 10:17 AM    HGB 7.0 (L) 10/15/2018 10:17 AM    HCT 21.5 (L) 10/15/2018 10:17 AM    PLATELET 244 (L) 61/87/5393 10:17 AM    MCV 90.7 10/15/2018 10:17 AM       Lab Results   Component Value Date/Time    Sodium 133 (L) 10/15/2018 10:17 AM    Potassium 3.1 (L) 10/15/2018 10:17 AM    Chloride 96 (L) 10/15/2018 10:17 AM    CO2 28 10/15/2018 10:17 AM    Anion gap 9 10/15/2018 10:17 AM    Glucose 320 (H) 10/15/2018 10:17 AM    BUN 33 (H) 10/15/2018 10:17 AM    Creatinine 1.10 (H) 10/15/2018 10:17 AM    BUN/Creatinine ratio 30 (H) 10/15/2018 10:17 AM    GFR est AA 59 (L) 10/15/2018 10:17 AM    GFR est non-AA 48 (L) 10/15/2018 10:17 AM    Calcium 7.9 (L) 10/15/2018 10:17 AM    Bilirubin, total 7.7 (H) 10/15/2018 10:17 AM    AST (SGOT) 54 (H) 10/15/2018 10:17 AM    Alk.  phosphatase 520 (H) 10/15/2018 10:17 AM    Protein, total 5.6 (L) 10/15/2018 10:17 AM    Albumin 1.3 (L) 10/15/2018 10:17 AM    Globulin 4.3 (H) 10/15/2018 10:17 AM    A-G Ratio 0.3 (L) 10/15/2018 10:17 AM    ALT (SGPT) 42 10/15/2018 10:17 AM           MRI Results (most recent):  Results from [place] encounter on 10/19/18   MRI ABD W WO CONT    Narrative MRI ABDOMEN AND MRCP WITH AND WITHOUT CONTRAST. INDICATION: Gallbladder carcinoma- elevated bilirubin    COMPARISON: CT abdomen and pelvis 8/13/2018. TECHNIQUE: Multisequence and multiplanar MRI of the abdomen was performed after  the administration of 7 mL of gadobenate dimeglumine (Multihance) IV contrast.  Images were obtained without contrast; and in late arterial, portal venous, and  delayed phases after the administration of contrast. Subtraction images were  reconstructed. Heavily T2-weighted thick slab and thin slice images were obtained in the  oblique coronal plane through the biliary tree (MRCP). FINDINGS:     LIVER: Even given difference in technique, hepatic extension of the primary  gallbladder carcinoma has significantly increased in the last 2 months. Posteriorly, it now extends nearly to the posterior capsule in segment 6 (6-17). Inferiorly, it extends to and bulges the capsule medially (4-10). Posteromedially, it is inseparable from nate hepatis david metastases. Its  margins are ill-defined and best visualized on T2-weighted images. It now  measures approximately 59 x 70 x 52 mm, versus approximately 38 x 52 x 36 mm on  8/13/2018. Some of the difference is attributable to difference in technique  between CT and MRI, though it has clearly increased in 2 months. Has increased  as well, and severe intrahepatic biliary ductal dilation has increased. For  example, compare the left lateral intrahepatic bile duct on image 6-12 (14 mm)  to the same duct on image 2-19 on 8/13/2018 (12 mm). Tumor now extends along the  posterior branch of the right hepatic duct on image 9-19, and encases and  narrows the left portal vein (9-22, 6-13, 1302-51). The segment 8-4A junction metastasis has also increased, and now measures 64 x  49 x 54 mm, versus 30 x 39 x 32 mm on 8/13/2018.  Bright T2 hyperintensity within  a metastasis or cluster of metastasis in the subcapsular aspect of segment 7  probably represents a focally dilated biliary radical. This makes the soft  tissue component of the new metastasis difficult to measure. Inclusive of the  associated dilated peripheral bile ducts/radicles, it measures 30 x 33 x 46 mm. Capsular retraction is associated (6-12). There are new, smaller metastases in  the caudate (6-14) and at the segment 1-8 junction (6-12). Additional tiny,  scattered, T2 hyperintense, intrahepatic metastases are best seen on series 9. EXTRAHEPATIC METASTASES: Metastatic lymphadenopathy in the nate hepatis and  portacaval regions has increased. The largest of these lymph nodes now measures  28 mm in short axis and 28 x 48 x 30 mm in 3 dimensions (21 x 27 x 27 mm on  8/13/2018). This metastasis is now inseparable from tumor in the nate hepatis. Suprapancreatic, aortocaval/retroperitoneal (1302-77), and celiac axis david  metastases are new. Posteroinferiorly, the primary tumor now extends clearly  beyond the gallbladder and liver capsule. There is extension along the  peritoneum in the right paracolic gutter (1449-11, 1302-70). Outside this direct  extension of tumor into the peritoneal space, there is no overt, diffuse  peritoneal carcinomatosis. However, trace ascites is new. MRCP: Significantly motion limited. The following observations regarding the  bile ducts are made from other sequences: A common bile duct extends into the  right intrahepatic duct. However, this is completely encased by tumor in the  hepatic hilum and metastatic lymphadenopathy in the nate hepatis (9-25, 9-33). As noted above, severe intrahepatic biliary ductal dilation has increased. The  left intrahepatic duct is completely obstructed. The stent affects some  decompression of the right intrahepatic bile ducts, though these are also  moderately dilated.  No overt pancreatic duct dilation. GALLBLADDER: Several gallstones are encased by tumor. No normal residual  gallbladder architecture. PANCREAS: Grossly normal.  SPLEEN: A new hypointense enhancing lesion in the posterior aspect of the spleen  could represent an infarction, as it appears relatively wedge-shaped on arterial  phase images (1301-46). On T2-weighted images, it is more masslike, it is  identical in heterogeneous T2 hyperintense signal to the gallbladder carcinoma,  and it extends outside the splenic capsule (7-11). On precontrast T1-weighted  images, however, it is T1 hyperintense, and tumor is T1 hypointense. The T1  hyperintensity favors hemorrhagic infarction slightly over metastasis. It  measures approximately 19 x 24 mm. ADRENALS: Normal.  KIDNEYS: Incidental cysts and T1 hyperintense hemorrhagic cysts. DISTAL ESOPHAGUS: Normal.  STOMACH AND DUODENUM: Normal.  VISUALIZED SMALL BOWEL AND COLON: Normal.  PERITONEUM: Trace ascites is new. Moderate anasarca is noted. Trace bilateral  pleural effusions are new. VISUALIZED LUNG BASES: Grossly clear within the sensitivity of MRI. BONES: No suspicious lesions. Incidental hemangioma at the right superolateral  corner of L2. Impression IMPRESSION: Rapid progression of gallbladder carcinoma in 2 months:  1. Increased primary tumor. 2. Increased number and size of hepatic metastases. 3. Increased number and size of upper abdominal david metastases. 4. Direct extension through the hepatic capsule into the peritoneal space along  the right paracolic gutter. Though there is not yet clear diffuse peritoneal  carcinomatosis, trace ascites is new. 5. Increased tumor in the nate hepatis completely encases a common bile duct  stent. Severe intrahepatic biliary ductal dilation has increased. The left  intrahepatic duct is completely obstructed. 6. New splenic mass. A hemorrhagic infarction is favored over metastasis. Assessment:     1.  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 Received palliative chemotherapy   Gemcitabine (1000 mg/m2 on days 1, 8) and Cisplatin  (25mg/m2 on day 1) s/p 1 Cycle    Feeling poorly  + fatigue, taste alterations, anemia, weight loss  Disease has progressed   Prognosis is poor. She would not benefit from additional therapy. I recommend Hospice care. 2. Protein calorie malnutrition    Protein supplementation      3. Anemia    Transfuse 1 unit PRBCs      4. Hyperbilirubinemia    > Follow-up with Dr. Mira Ley       5. Hypokalemia, hypomagnesia     > Potassium 20 mEq IV   > Magnesium 3 g IV   > Potassium 10 mEq po daily - prescription sent to pharmacy        Plan:       · D/C chemotherapy   · Hospice consultation      Signed by: Jamir Lomeli MD                     October 22, 2018        CC. Laina Bedoya MD  CC. Lore Harkins MD  CC. Alondra Delcid MD  CC.  Jovan Beltran MD

## 2018-10-29 ENCOUNTER — APPOINTMENT (OUTPATIENT)
Dept: INFUSION THERAPY | Age: 78
End: 2018-10-29
Payer: MEDICARE

## 2018-10-29 ENCOUNTER — HOSPITAL ENCOUNTER (OUTPATIENT)
Dept: INFUSION THERAPY | Age: 78
End: 2018-10-29
Payer: MEDICARE

## 2018-11-05 ENCOUNTER — APPOINTMENT (OUTPATIENT)
Dept: INFUSION THERAPY | Age: 78
End: 2018-11-05

## 2018-11-12 ENCOUNTER — APPOINTMENT (OUTPATIENT)
Dept: INFUSION THERAPY | Age: 78
End: 2018-11-12

## 2018-11-26 ENCOUNTER — APPOINTMENT (OUTPATIENT)
Dept: INFUSION THERAPY | Age: 78
End: 2018-11-26

## 2021-10-08 NOTE — ANESTHESIA PREPROCEDURE EVALUATION
Anesthetic History               Review of Systems / Medical History  Patient summary reviewed, nursing notes reviewed and pertinent labs reviewed    Pulmonary                   Neuro/Psych              Cardiovascular    Hypertension              Exercise tolerance: >4 METS     GI/Hepatic/Renal               Comments: metastatic gallbladder cancer.  Endo/Other    Diabetes         Other Findings   Comments: jaundice         Physical Exam    Airway  Mallampati: I  TM Distance: > 6 cm  Neck ROM: normal range of motion   Mouth opening: Normal     Cardiovascular    Rhythm: regular  Rate: normal         Dental  No notable dental hx       Pulmonary  Breath sounds clear to auscultation               Abdominal         Other Findings            Anesthetic Plan    ASA: 3  Anesthesia type: general          Induction: Intravenous  Anesthetic plan and risks discussed with: Patient Yes...

## (undated) DEVICE — Device

## (undated) DEVICE — BAG BELONG PT PERS CLEAR HANDL

## (undated) DEVICE — SPHINCTEROTOME: Brand: DREAMTOME™ RX 44

## (undated) DEVICE — BALLOON DILATATION CATHETER: Brand: HURRICANE™ RX

## (undated) DEVICE — BW-412T DISP COMBO CLEANING BRUSH: Brand: SINGLE USE COMBINATION CLEANING BRUSH

## (undated) DEVICE — Z DISCONTINUED NO SUB IDED SET EXTN W/ 4 W STPCOCK M SPIN LOK 36IN

## (undated) DEVICE — REM POLYHESIVE ADULT PATIENT RETURN ELECTRODE: Brand: VALLEYLAB

## (undated) DEVICE — SYR 3ML LL TIP 1/10ML GRAD --

## (undated) DEVICE — SNARE ENDOSCP M L240CM W27MM SHTH DIA2.4MM CHN 2.8MM OVL

## (undated) DEVICE — NDL PRT INJ NSAF BLNT 18GX1.5 --

## (undated) DEVICE — ENDO CARRY-ON PROCEDURE KIT INCLUDES ENZYMATIC SPONGE, GAUZE, BIOHAZARD LABEL, TRAY, LUBRICANT, DIRTY SCOPE LABEL, WATER LABEL, TRAY, DRAWSTRING PAD, AND DEFENDO 4-PIECE KIT.: Brand: ENDO CARRY-ON PROCEDURE KIT

## (undated) DEVICE — CUFF BLD PRSS CHILD SM SZ 8 FOR 12-16CM LIMB VYN SFT W/O TB

## (undated) DEVICE — PREP SKN CHLRAPRP SNGL 1.75ML --

## (undated) DEVICE — DEVICE LCK BILI RAP EXCHG OLPS --

## (undated) DEVICE — KIT COLON W/ 1.1OZ LUB AND 2 END

## (undated) DEVICE — Device: Brand: SINGLE USE SOFT BRUSH

## (undated) DEVICE — DEVON™ KNEE AND BODY STRAP 60" X 3" (1.5 M X 7.6 CM): Brand: DEVON

## (undated) DEVICE — 1200 GUARD II KIT W/5MM TUBE W/O VAC TUBE: Brand: GUARDIAN

## (undated) DEVICE — SOLIDIFIER FLUID 3000 CC ABSORB

## (undated) DEVICE — (D)CUP DENT 7.5OZ PLAS DSTY -- DISC BY MFR USE ITEM 341725

## (undated) DEVICE — SYR 5ML 1/5 GRAD LL NSAF LF --

## (undated) DEVICE — TRNQT TEXT 1X18IN BLU LF DISP -- CONVERT TO ITEM 362165

## (undated) DEVICE — NDL FLTR TIP 5 MIC 18GX1.5IN --

## (undated) DEVICE — KENDALL RADIOLUCENT FOAM MONITORING ELECTRODE -RECTANGULAR SHAPE: Brand: KENDALL

## (undated) DEVICE — SET ADMIN 16ML TBNG L100IN 2 Y INJ SITE IV PIGGY BK DISP

## (undated) DEVICE — CATH IV AUTOGRD BC BLU 22GA 25 -- INSYTE

## (undated) DEVICE — SYR LR LCK 1ML GRAD NSAF 30ML --

## (undated) DEVICE — MEDI-VAC NON-CONDUCTIVE SUCTION TUBING: Brand: CARDINAL HEALTH

## (undated) DEVICE — BITEBLOCK ENDOSCP 60FR MAXI WHT POLYETH STURDY W/ VELC WVN

## (undated) DEVICE — CANN NASAL O2 CAPNOGRAPHY AD -- FILTERLINE